# Patient Record
Sex: MALE | Race: WHITE | Employment: OTHER | URBAN - METROPOLITAN AREA
[De-identification: names, ages, dates, MRNs, and addresses within clinical notes are randomized per-mention and may not be internally consistent; named-entity substitution may affect disease eponyms.]

---

## 2017-01-01 ENCOUNTER — MYC MEDICAL ADVICE (OUTPATIENT)
Dept: FAMILY MEDICINE | Facility: CLINIC | Age: 72
End: 2017-01-01

## 2017-01-01 ENCOUNTER — PRE VISIT (OUTPATIENT)
Dept: PULMONOLOGY | Facility: CLINIC | Age: 72
End: 2017-01-01

## 2017-01-01 ENCOUNTER — OFFICE VISIT (OUTPATIENT)
Dept: FAMILY MEDICINE | Facility: CLINIC | Age: 72
End: 2017-01-01
Payer: MEDICARE

## 2017-01-01 ENCOUNTER — TELEPHONE (OUTPATIENT)
Dept: FAMILY MEDICINE | Facility: CLINIC | Age: 72
End: 2017-01-01

## 2017-01-01 ENCOUNTER — OFFICE VISIT (OUTPATIENT)
Dept: NURSING | Facility: CLINIC | Age: 72
End: 2017-01-01
Payer: MEDICARE

## 2017-01-01 ENCOUNTER — TRANSFERRED RECORDS (OUTPATIENT)
Dept: HEALTH INFORMATION MANAGEMENT | Facility: CLINIC | Age: 72
End: 2017-01-01

## 2017-01-01 ENCOUNTER — TELEPHONE (OUTPATIENT)
Dept: PULMONOLOGY | Facility: CLINIC | Age: 72
End: 2017-01-01

## 2017-01-01 ENCOUNTER — OFFICE VISIT (OUTPATIENT)
Dept: PULMONOLOGY | Facility: CLINIC | Age: 72
End: 2017-01-01
Payer: MEDICARE

## 2017-01-01 VITALS
WEIGHT: 235.3 LBS | RESPIRATION RATE: 16 BRPM | BODY MASS INDEX: 31.87 KG/M2 | TEMPERATURE: 98.6 F | HEIGHT: 72 IN | SYSTOLIC BLOOD PRESSURE: 134 MMHG | DIASTOLIC BLOOD PRESSURE: 80 MMHG | OXYGEN SATURATION: 94 % | HEART RATE: 74 BPM

## 2017-01-01 VITALS
TEMPERATURE: 98.9 F | OXYGEN SATURATION: 97 % | BODY MASS INDEX: 31.08 KG/M2 | HEIGHT: 72 IN | HEART RATE: 71 BPM | RESPIRATION RATE: 24 BRPM | DIASTOLIC BLOOD PRESSURE: 77 MMHG | WEIGHT: 229.5 LBS | SYSTOLIC BLOOD PRESSURE: 145 MMHG

## 2017-01-01 VITALS
HEIGHT: 73 IN | RESPIRATION RATE: 16 BRPM | BODY MASS INDEX: 30.46 KG/M2 | WEIGHT: 229.8 LBS | TEMPERATURE: 98.4 F | DIASTOLIC BLOOD PRESSURE: 69 MMHG | OXYGEN SATURATION: 94 % | HEART RATE: 82 BPM | SYSTOLIC BLOOD PRESSURE: 104 MMHG

## 2017-01-01 VITALS
DIASTOLIC BLOOD PRESSURE: 86 MMHG | HEART RATE: 83 BPM | WEIGHT: 238.4 LBS | SYSTOLIC BLOOD PRESSURE: 134 MMHG | BODY MASS INDEX: 32.33 KG/M2 | TEMPERATURE: 98.6 F | OXYGEN SATURATION: 97 %

## 2017-01-01 VITALS
OXYGEN SATURATION: 95 % | RESPIRATION RATE: 16 BRPM | TEMPERATURE: 97.8 F | HEART RATE: 88 BPM | HEIGHT: 73 IN | SYSTOLIC BLOOD PRESSURE: 128 MMHG | DIASTOLIC BLOOD PRESSURE: 82 MMHG | WEIGHT: 219.2 LBS | BODY MASS INDEX: 29.05 KG/M2

## 2017-01-01 DIAGNOSIS — Z11.59 NEED FOR HEPATITIS C SCREENING TEST: ICD-10-CM

## 2017-01-01 DIAGNOSIS — E29.1 MALE HYPOGONADISM: ICD-10-CM

## 2017-01-01 DIAGNOSIS — Z72.0 TOBACCO ABUSE: ICD-10-CM

## 2017-01-01 DIAGNOSIS — E78.5 HYPERLIPIDEMIA LDL GOAL <130: ICD-10-CM

## 2017-01-01 DIAGNOSIS — L30.9 DERMATITIS: ICD-10-CM

## 2017-01-01 DIAGNOSIS — J44.9 CHRONIC OBSTRUCTIVE PULMONARY DISEASE, UNSPECIFIED COPD TYPE (H): ICD-10-CM

## 2017-01-01 DIAGNOSIS — E22.2 SIADH (SYNDROME OF INAPPROPRIATE ADH PRODUCTION) (H): ICD-10-CM

## 2017-01-01 DIAGNOSIS — B37.2 YEAST DERMATITIS: ICD-10-CM

## 2017-01-01 DIAGNOSIS — E22.2 SIADH (SYNDROME OF INAPPROPRIATE ADH PRODUCTION) (H): Primary | ICD-10-CM

## 2017-01-01 DIAGNOSIS — R10.13 DYSPEPSIA: ICD-10-CM

## 2017-01-01 DIAGNOSIS — I10 ESSENTIAL HYPERTENSION WITH GOAL BLOOD PRESSURE LESS THAN 140/90: ICD-10-CM

## 2017-01-01 DIAGNOSIS — J44.9 CHRONIC OBSTRUCTIVE PULMONARY DISEASE, UNSPECIFIED COPD TYPE (H): Primary | ICD-10-CM

## 2017-01-01 DIAGNOSIS — R76.8 POSITIVE HEPATITIS C ANTIBODY TEST: ICD-10-CM

## 2017-01-01 DIAGNOSIS — L50.2 HIVES DUE TO COLD EXPOSURE: ICD-10-CM

## 2017-01-01 DIAGNOSIS — L72.0 EIC (EPIDERMAL INCLUSION CYST): ICD-10-CM

## 2017-01-01 DIAGNOSIS — L50.2 HIVES DUE TO COLD EXPOSURE: Primary | ICD-10-CM

## 2017-01-01 DIAGNOSIS — J44.1 COPD EXACERBATION (H): Primary | ICD-10-CM

## 2017-01-01 DIAGNOSIS — R53.83 FATIGUE, UNSPECIFIED TYPE: ICD-10-CM

## 2017-01-01 DIAGNOSIS — Z23 NEED FOR PROPHYLACTIC VACCINATION AND INOCULATION AGAINST INFLUENZA: ICD-10-CM

## 2017-01-01 LAB
6 MIN WALK (FT): NORMAL FT
6 MIN WALK (M): NORMAL M
ALBUMIN SERPL-MCNC: 3.4 G/DL (ref 3.4–5)
ALBUMIN SERPL-MCNC: 3.8 G/DL (ref 3.4–5)
ALP SERPL-CCNC: 100 U/L (ref 40–150)
ALP SERPL-CCNC: 162 U/L (ref 40–150)
ALT SERPL W P-5'-P-CCNC: 15 U/L (ref 0–70)
ALT SERPL W P-5'-P-CCNC: 17 U/L (ref 0–70)
ANION GAP SERPL CALCULATED.3IONS-SCNC: 4 MMOL/L (ref 3–14)
ANION GAP SERPL CALCULATED.3IONS-SCNC: 5 MMOL/L (ref 3–14)
ANION GAP SERPL CALCULATED.3IONS-SCNC: 5 MMOL/L (ref 3–14)
ANION GAP SERPL CALCULATED.3IONS-SCNC: 6 MMOL/L (ref 3–14)
ANION GAP SERPL CALCULATED.3IONS-SCNC: 7 MMOL/L (ref 3–14)
ANION GAP SERPL CALCULATED.3IONS-SCNC: 8 MMOL/L (ref 3–14)
AST SERPL W P-5'-P-CCNC: 16 U/L (ref 0–45)
AST SERPL W P-5'-P-CCNC: 22 U/L (ref 0–45)
BILIRUB DIRECT SERPL-MCNC: 0.2 MG/DL (ref 0–0.2)
BILIRUB SERPL-MCNC: 0.7 MG/DL (ref 0.2–1.3)
BILIRUB SERPL-MCNC: 0.8 MG/DL (ref 0.2–1.3)
BUN SERPL-MCNC: 10 MG/DL (ref 7–30)
BUN SERPL-MCNC: 11 MG/DL (ref 7–30)
BUN SERPL-MCNC: 12 MG/DL (ref 7–30)
BUN SERPL-MCNC: 13 MG/DL (ref 7–30)
BUN SERPL-MCNC: 7 MG/DL (ref 7–30)
BUN SERPL-MCNC: 7 MG/DL (ref 7–30)
CALCIUM SERPL-MCNC: 8.5 MG/DL (ref 8.5–10.1)
CALCIUM SERPL-MCNC: 8.6 MG/DL (ref 8.5–10.1)
CALCIUM SERPL-MCNC: 8.6 MG/DL (ref 8.5–10.1)
CALCIUM SERPL-MCNC: 8.7 MG/DL (ref 8.5–10.1)
CALCIUM SERPL-MCNC: 8.7 MG/DL (ref 8.5–10.1)
CALCIUM SERPL-MCNC: 8.8 MG/DL (ref 8.5–10.1)
CHLORIDE SERPL-SCNC: 95 MMOL/L (ref 94–109)
CHLORIDE SERPL-SCNC: 96 MMOL/L (ref 94–109)
CHLORIDE SERPL-SCNC: 97 MMOL/L (ref 94–109)
CHLORIDE SERPL-SCNC: 99 MMOL/L (ref 94–109)
CHOLEST SERPL-MCNC: 136 MG/DL
CO2 SERPL-SCNC: 29 MMOL/L (ref 20–32)
CO2 SERPL-SCNC: 30 MMOL/L (ref 20–32)
CO2 SERPL-SCNC: 30 MMOL/L (ref 20–32)
CO2 SERPL-SCNC: 33 MMOL/L (ref 20–32)
CO2 SERPL-SCNC: 35 MMOL/L (ref 20–32)
CO2 SERPL-SCNC: 36 MMOL/L (ref 20–32)
CREAT SERPL-MCNC: 0.86 MG/DL (ref 0.66–1.25)
CREAT SERPL-MCNC: 0.9 MG/DL (ref 0.66–1.25)
CREAT SERPL-MCNC: 0.96 MG/DL (ref 0.66–1.25)
CREAT SERPL-MCNC: 0.96 MG/DL (ref 0.66–1.25)
CREAT SERPL-MCNC: 0.99 MG/DL (ref 0.66–1.25)
CREAT SERPL-MCNC: 1.12 MG/DL (ref 0.66–1.25)
DLCOUNC-%PRED-PRE: 82 %
DLCOUNC-%PRED-PRE: 90 %
DLCOUNC-PRE: 22.04 ML/MIN/MMHG
DLCOUNC-PRE: 24.16 ML/MIN/MMHG
DLCOUNC-PRED: 26.77 ML/MIN/MMHG
DLCOUNC-PRED: 26.83 ML/MIN/MMHG
ERV-%PRED-PRE: 101 %
ERV-%PRED-PRE: 40 %
ERV-PRE: 0.4 L
ERV-PRE: 0.98 L
ERV-PRED: 0.96 L
ERV-PRED: 0.97 L
EXPTIME-PRE: 9.07 SEC
EXPTIME-PRE: 9.51 SEC
FEF2575-%PRED-POST: 50 %
FEF2575-%PRED-PRE: 25 %
FEF2575-%PRED-PRE: 32 %
FEF2575-POST: 1.29 L/SEC
FEF2575-PRE: 0.66 L/SEC
FEF2575-PRE: 0.83 L/SEC
FEF2575-PRED: 2.53 L/SEC
FEF2575-PRED: 2.55 L/SEC
FEFMAX-%PRED-PRE: 45 %
FEFMAX-%PRED-PRE: 53 %
FEFMAX-PRE: 3.94 L/SEC
FEFMAX-PRE: 4.62 L/SEC
FEFMAX-PRED: 8.71 L/SEC
FEFMAX-PRED: 8.74 L/SEC
FEV1-%PRED-PRE: 42 %
FEV1-%PRED-PRE: 52 %
FEV1-PRE: 1.45 L
FEV1-PRE: 1.78 L
FEV1FEV6-PRE: 55 %
FEV1FEV6-PRE: 57 %
FEV1FEV6-PRED: 78 %
FEV1FEV6-PRED: 78 %
FEV1FVC-PRE: 51 %
FEV1FVC-PRE: 54 %
FEV1FVC-PRED: 76 %
FEV1FVC-PRED: 76 %
FEV1SVC-PRE: 35 %
FEV1SVC-PRE: 50 %
FEV1SVC-PRED: 67 %
FEV1SVC-PRED: 67 %
FIFMAX-PRE: 3.79 L/SEC
FIFMAX-PRE: 3.96 L/SEC
FRCPLETH-%PRED-PRE: 159 %
FRCPLETH-%PRED-PRE: 176 %
FRCPLETH-PRE: 6.08 L
FRCPLETH-PRE: 6.74 L
FRCPLETH-PRED: 3.81 L
FRCPLETH-PRED: 3.81 L
FVC-%PRED-PRE: 62 %
FVC-%PRED-PRE: 73 %
FVC-PRE: 2.82 L
FVC-PRE: 3.31 L
FVC-PRED: 4.49 L
FVC-PRED: 4.5 L
GFR SERPL CREATININE-BSD FRML MDRD: 65 ML/MIN/1.7M2
GFR SERPL CREATININE-BSD FRML MDRD: 75 ML/MIN/1.7M2
GFR SERPL CREATININE-BSD FRML MDRD: 77 ML/MIN/1.7M2
GFR SERPL CREATININE-BSD FRML MDRD: 77 ML/MIN/1.7M2
GFR SERPL CREATININE-BSD FRML MDRD: 83 ML/MIN/1.7M2
GFR SERPL CREATININE-BSD FRML MDRD: 88 ML/MIN/1.7M2
GLUCOSE SERPL-MCNC: 101 MG/DL (ref 70–99)
GLUCOSE SERPL-MCNC: 96 MG/DL (ref 70–99)
GLUCOSE SERPL-MCNC: 96 MG/DL (ref 70–99)
GLUCOSE SERPL-MCNC: 97 MG/DL (ref 70–99)
GLUCOSE SERPL-MCNC: 99 MG/DL (ref 70–99)
GLUCOSE SERPL-MCNC: 99 MG/DL (ref 70–99)
HCV AB SERPL QL IA: ABNORMAL
HCV RNA SERPL NAA+PROBE-ACNC: NORMAL [IU]/ML
HCV RNA SERPL NAA+PROBE-LOG IU: NORMAL LOG IU/ML
HDLC SERPL-MCNC: 66 MG/DL
IC-%PRED-PRE: 76 %
IC-%PRED-PRE: 77 %
IC-PRE: 3.12 L
IC-PRE: 3.15 L
IC-PRED: 4.09 L
IC-PRED: 4.1 L
LDLC SERPL CALC-MCNC: 59 MG/DL
NONHDLC SERPL-MCNC: 70 MG/DL
POTASSIUM SERPL-SCNC: 4 MMOL/L (ref 3.4–5.3)
POTASSIUM SERPL-SCNC: 4 MMOL/L (ref 3.4–5.3)
POTASSIUM SERPL-SCNC: 4.1 MMOL/L (ref 3.4–5.3)
POTASSIUM SERPL-SCNC: 4.3 MMOL/L (ref 3.4–5.3)
POTASSIUM SERPL-SCNC: 4.4 MMOL/L (ref 3.4–5.3)
POTASSIUM SERPL-SCNC: 4.7 MMOL/L (ref 3.4–5.3)
PROT SERPL-MCNC: 7.4 G/DL (ref 6.8–8.8)
PROT SERPL-MCNC: 7.6 G/DL (ref 6.8–8.8)
RVPLETH-%PRED-PRE: 188 %
RVPLETH-%PRED-PRE: 234 %
RVPLETH-PRE: 5.1 L
RVPLETH-PRE: 6.34 L
RVPLETH-PRED: 2.7 L
RVPLETH-PRED: 2.71 L
SODIUM SERPL-SCNC: 131 MMOL/L (ref 133–144)
SODIUM SERPL-SCNC: 132 MMOL/L (ref 133–144)
SODIUM SERPL-SCNC: 135 MMOL/L (ref 133–144)
SODIUM SERPL-SCNC: 136 MMOL/L (ref 133–144)
SODIUM SERPL-SCNC: 136 MMOL/L (ref 133–144)
SODIUM SERPL-SCNC: 137 MMOL/L (ref 133–144)
TLCPLETH-%PRED-PRE: 122 %
TLCPLETH-%PRED-PRE: 132 %
TLCPLETH-PRE: 9.2 L
TLCPLETH-PRE: 9.89 L
TLCPLETH-PRED: 7.48 L
TLCPLETH-PRED: 7.48 L
TRIGL SERPL-MCNC: 54 MG/DL
VA-%PRED-PRE: 86 %
VA-%PRED-PRE: 86 %
VA-PRE: 6.17 L
VA-PRE: 6.2 L
VC-%PRED-PRE: 70 %
VC-%PRED-PRE: 81 %
VC-PRE: 3.55 L
VC-PRE: 4.1 L
VC-PRED: 5.06 L
VC-PRED: 5.06 L

## 2017-01-01 PROCEDURE — 86803 HEPATITIS C AB TEST: CPT | Performed by: FAMILY MEDICINE

## 2017-01-01 PROCEDURE — 99214 OFFICE O/P EST MOD 30 MIN: CPT | Mod: 25 | Performed by: FAMILY MEDICINE

## 2017-01-01 PROCEDURE — 36415 COLL VENOUS BLD VENIPUNCTURE: CPT | Performed by: FAMILY MEDICINE

## 2017-01-01 PROCEDURE — 80048 BASIC METABOLIC PNL TOTAL CA: CPT | Performed by: FAMILY MEDICINE

## 2017-01-01 PROCEDURE — 80053 COMPREHEN METABOLIC PANEL: CPT | Performed by: FAMILY MEDICINE

## 2017-01-01 PROCEDURE — 94375 RESPIRATORY FLOW VOLUME LOOP: CPT | Performed by: INTERNAL MEDICINE

## 2017-01-01 PROCEDURE — 94729 DIFFUSING CAPACITY: CPT | Performed by: INTERNAL MEDICINE

## 2017-01-01 PROCEDURE — 87522 HEPATITIS C REVRS TRNSCRPJ: CPT | Performed by: FAMILY MEDICINE

## 2017-01-01 PROCEDURE — 99214 OFFICE O/P EST MOD 30 MIN: CPT | Performed by: FAMILY MEDICINE

## 2017-01-01 PROCEDURE — 94726 PLETHYSMOGRAPHY LUNG VOLUMES: CPT | Performed by: INTERNAL MEDICINE

## 2017-01-01 PROCEDURE — 94664 DEMO&/EVAL PT USE INHALER: CPT | Mod: 59 | Performed by: INTERNAL MEDICINE

## 2017-01-01 PROCEDURE — 80061 LIPID PANEL: CPT | Performed by: FAMILY MEDICINE

## 2017-01-01 PROCEDURE — 94060 EVALUATION OF WHEEZING: CPT | Performed by: INTERNAL MEDICINE

## 2017-01-01 PROCEDURE — 99214 OFFICE O/P EST MOD 30 MIN: CPT | Mod: 25 | Performed by: INTERNAL MEDICINE

## 2017-01-01 PROCEDURE — 94620 HC PULMONARY STRESS TEST, SIMPLE: CPT | Performed by: INTERNAL MEDICINE

## 2017-01-01 PROCEDURE — G0008 ADMIN INFLUENZA VIRUS VAC: HCPCS | Performed by: FAMILY MEDICINE

## 2017-01-01 PROCEDURE — 99204 OFFICE O/P NEW MOD 45 MIN: CPT | Mod: 25 | Performed by: INTERNAL MEDICINE

## 2017-01-01 PROCEDURE — 90662 IIV NO PRSV INCREASED AG IM: CPT | Performed by: FAMILY MEDICINE

## 2017-01-01 PROCEDURE — 82248 BILIRUBIN DIRECT: CPT | Performed by: FAMILY MEDICINE

## 2017-01-01 PROCEDURE — G0472 HEP C SCREEN HIGH RISK/OTHER: HCPCS | Performed by: FAMILY MEDICINE

## 2017-01-01 RX ORDER — SODIUM CHLORIDE 1 G/1
1 TABLET ORAL DAILY
Qty: 30 TABLET | Refills: 1 | Status: SHIPPED | OUTPATIENT
Start: 2017-01-01 | End: 2017-01-01

## 2017-01-01 RX ORDER — NYSTATIN 100000 U/G
CREAM TOPICAL
Qty: 30 G | Refills: 0 | Status: SHIPPED | OUTPATIENT
Start: 2017-01-01

## 2017-01-01 RX ORDER — AMLODIPINE AND BENAZEPRIL HYDROCHLORIDE 10; 40 MG/1; MG/1
CAPSULE ORAL
Qty: 90 CAPSULE | Refills: 1 | Status: SHIPPED | OUTPATIENT
Start: 2017-01-01 | End: 2017-01-01

## 2017-01-01 RX ORDER — ALBUTEROL SULFATE 90 UG/1
2 AEROSOL, METERED RESPIRATORY (INHALATION) EVERY 6 HOURS PRN
Qty: 3 INHALER | Refills: 3 | Status: SHIPPED | OUTPATIENT
Start: 2017-01-01

## 2017-01-01 RX ORDER — PREDNISONE 20 MG/1
20 TABLET ORAL DAILY
Qty: 21 TABLET | Refills: 0 | Status: SHIPPED | OUTPATIENT
Start: 2017-01-01 | End: 2018-01-01

## 2017-01-01 RX ORDER — TESTOSTERONE 10 MG/.5G
2 GEL, METERED TOPICAL DAILY
Qty: 60 G | Refills: 5 | Status: SHIPPED | OUTPATIENT
Start: 2017-01-01

## 2017-01-01 RX ORDER — NYSTATIN 100000 U/G
CREAM TOPICAL 3 TIMES DAILY
Qty: 30 G | Refills: 1 | Status: SHIPPED | OUTPATIENT
Start: 2017-01-01 | End: 2017-01-01

## 2017-01-01 RX ORDER — FLUOCINONIDE 0.5 MG/G
CREAM TOPICAL
Qty: 60 G | Refills: 2 | Status: SHIPPED | OUTPATIENT
Start: 2017-01-01 | End: 2018-01-01

## 2017-01-01 RX ORDER — CYANOCOBALAMIN 1000 UG/ML
INJECTION, SOLUTION INTRAMUSCULAR; SUBCUTANEOUS
Qty: 1 ML | Refills: 11 | Status: SHIPPED | OUTPATIENT
Start: 2017-01-01

## 2017-01-01 RX ORDER — FLUOCINONIDE 0.5 MG/G
CREAM TOPICAL
Qty: 60 G | Refills: 0 | OUTPATIENT
Start: 2017-01-01

## 2017-01-01 RX ORDER — PREDNISONE 20 MG/1
TABLET ORAL
Qty: 21 TABLET | Refills: 0 | Status: SHIPPED | OUTPATIENT
Start: 2017-01-01 | End: 2017-01-01

## 2017-01-01 RX ORDER — LORATADINE 10 MG/1
10 TABLET ORAL 2 TIMES DAILY PRN
Qty: 60 TABLET | Refills: 2 | Status: SHIPPED | OUTPATIENT
Start: 2017-01-01

## 2017-01-01 RX ORDER — FLUOCINONIDE TOPICAL SOLUTION USP, 0.05% 0.5 MG/ML
SOLUTION TOPICAL
Qty: 60 ML | Refills: 2 | Status: SHIPPED | OUTPATIENT
Start: 2017-01-01 | End: 2018-01-01

## 2017-01-01 RX ORDER — SODIUM CHLORIDE 1 G/1
TABLET ORAL
Qty: 90 TABLET | Refills: 1 | Status: SHIPPED | OUTPATIENT
Start: 2017-01-01

## 2017-01-01 RX ORDER — SODIUM CHLORIDE 1 G/1
TABLET ORAL
Qty: 30 TABLET | Refills: 2 | Status: SHIPPED | OUTPATIENT
Start: 2017-01-01 | End: 2017-01-01

## 2017-01-01 RX ORDER — FLUOCINONIDE 0.5 MG/G
CREAM TOPICAL
Qty: 60 G | Refills: 3 | Status: SHIPPED | OUTPATIENT
Start: 2017-01-01 | End: 2017-01-01

## 2017-01-01 ASSESSMENT — PAIN SCALES - GENERAL
PAINLEVEL: NO PAIN (1)
PAINLEVEL: NO PAIN (1)

## 2017-01-04 ENCOUNTER — DOCUMENTATION ONLY (OUTPATIENT)
Dept: OTHER | Facility: CLINIC | Age: 72
End: 2017-01-04

## 2017-01-04 DIAGNOSIS — Z71.89 ADVANCED DIRECTIVES, COUNSELING/DISCUSSION: Primary | Chronic | ICD-10-CM

## 2017-02-10 PROBLEM — R53.83 FATIGUE, UNSPECIFIED TYPE: Status: ACTIVE | Noted: 2017-01-01

## 2017-02-10 NOTE — PROGRESS NOTES
"  SUBJECTIVE:                                                    Alexi Oviedo is a 71 year old male who presents to clinic today for the following health issues:    1. Derm concern - rash under R arm (related to recent break)  2. Boil - back of neck  3. Hemorrhoids? -- pt previously had an abscess (surgically removed in 2014) - also had one removed 1 year later.  4. B12 injection?  5. Follow up labs - recheck sodium    SUBJECTIVE:  Here today to follow-up on a few issues. I saw him last time he was recently out of the hospital for a right humeral fracture. The fracture seems to be healing nicely and he has been going through therapy. Range of motion is slowly returning. But he developed an itchy rash in his right axilla. Wife has been putting some Lidex on the area and has reduced some of the itch but has not improved. Also notes a bump on his upper back which drains a thick foul-smelling material. Has had this for a number of years and they're wondering what can be done about it. Due for another B-12 injection - periodically for some nonspecific fatigue.  Also notes return of some hemorrhoids - but he has had a perirectal abscess in the past and wants to make sure that is not developing    Would also came out of the hospitalization was a chronically low sodium that seem consistent with SIADH given his urine osmolality. He is set up to see Dr. Sheppard in March but is wondering if he really needs to see her. I had a long discussion with the patient and his wife about the variable causes of SIADH - and then his case is difficult to say if this is an idiopathic condition, related to his lung issues, etc. He says he is feeling fine as long as he takes salt tablets and watches his fluid intake.    Review of systems otherwise negative.  Past medical, family, and social history reviewed and updated in chart.    OBJECTIVE:  /82 mmHg  Pulse 88  Temp(Src) 97.8  F (36.6  C) (Oral)  Resp 16  Ht 1.854 m (6' 1\")  Wt " 99.428 kg (219 lb 3.2 oz)  BMI 28.93 kg/m2  SpO2 95%  Alert, pleasant, upbeat, and in no apparent discomfort.  Ears normal. Throat and pharynx normal. Neck supple. No adenopathy or masses in the neck or supraclavicular regions. Sinuses non tender.  S1 and S2 normal, no murmurs, clicks, gallops or rubs. Regular rate and rhythm. Chest is clear; no wheezes or rales. No edema or JVD.   Skin - I cannot discern a discrete cyst on his upper back but he has an area bandage and there has the typical smell of epithelial material in that area  He has a dull red confluent eruption in his right axilla  Anal - no evidence of abscess. Small hemorrhoid formations  Past labs reviewed with the patient.     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 We can recheck the level. I guess my thinking is might be nice to have him see Dr. Sheppard at least one time in consultation to make sure we are following the right path. Fluid restriction is the key to treating this unless it is something that is a fixable type issue - that is the reason for the referral  Plan: Basic metabolic panel, sodium chloride 1 GM         tablet            (R53.83) Fatigue, unspecified type  Comment: refilled   Plan: cyanocobalamin (VITAMIN B12) 1000 MCG/ML         injection            (L72.0) EIC (epidermal inclusion cyst)  Comment: I would like them to let the cyst develop a little bit more and then we can squeeze him in for an excision - right now I wouldn't know exactly where to cut  Plan:     (B37.2) Yeast dermatitis  Comment: Stop cortisone  Plan: nystatin (MYCOSTATIN) cream            (Z11.59) Need for hepatitis C screening test  Comment:   Plan: Hepatitis C antibody            Follow up as above  PETAR Mari MD    (Chart documentation completed in part with Dragon voice-recognition software.  Even though reviewed some  grammatical, spelling, and word errors may remain.)

## 2017-02-10 NOTE — NURSING NOTE
"Chief Complaint   Patient presents with     Derm Problem     rash under arm, boil (how can we not allow this to follow up?)     Lab Result Notice     sodium result -- next steps as well     Imm/Inj     b12?       Initial /82 mmHg  Pulse 88  Temp(Src) 97.8  F (36.6  C) (Oral)  Resp 16  Ht 1.854 m (6' 1\")  Wt 99.428 kg (219 lb 3.2 oz)  BMI 28.93 kg/m2  SpO2 95% Estimated body mass index is 28.93 kg/(m^2) as calculated from the following:    Height as of this encounter: 1.854 m (6' 1\").    Weight as of this encounter: 99.428 kg (219 lb 3.2 oz).  Medication Reconciliation: complete     Will Amy GONZÁLES      "

## 2017-02-23 NOTE — PROGRESS NOTES
Reed,  Great news!  There was no virus detected upon direct testing.  So either this was a false positive screening test, or you have immunity from some distant past exposure.  Either way, very good results  PETAR Mari M.D.

## 2017-04-11 NOTE — TELEPHONE ENCOUNTER
PULMONARY NEW PATIENT PRE-VISIT ASSESSMENT    Date Patient Contacted: 4/11/17    Confirmed appointment times and location     1. Patient is scheduled to see Dr. Reece on 5/2/17  2. Reason for visit: Chronic obstructive pulmonary disease, unspecified COPD type (H) [J44.9]   3. Referring provider PCP - Dr. Mari    4. Has patient seen previous specialist? No      5. Previous Imaging: In [facility]: Last CXR done: 5/15/15, Last Chest CT done: none      Outside Imaging: Location/Date/Type/Status (Requested, Received, Patient will hand carry disc, Pushed to [employer], Disc will be mailed) CXR done 12/16/16 at [facility] - report in CareEverywhere. Pt denies any other. Encounter routed to admin coordinators to request image be pushed to our system.        6.  Pulmonary Function Tests: Scheduled at  5/2/17       Outside PFT Lab:  Location/Date/Status (Requested, Received, Patient will hand carry) Pt denies     7.  Sleep History (sleep concerns, diagnosed sleep disorders): Pt denies    Prior Sleep Study: Pt denies    CPAP/BIPAP? Pt denies    Oxygen? Pt denies    Previsit review complete.  Patient will see provider at future scheduled appointment.     Patient Reminders Given:  Please, make sure you bring an updated list of your medications.   If you need to cancel or reschedule, please call [phone].      Latasha Ryder, RN, BSN  Pulmonary Care Coordinator

## 2017-04-11 NOTE — TELEPHONE ENCOUNTER
Routing refill request to provider for review/approval because:  Drug not on the [facility] refill protocol   Linda Blas RN

## 2017-04-11 NOTE — TELEPHONE ENCOUNTER
sodium chloride 1 GM tablet      Last Written Prescription Date: 2/10/17  Last Fill Quantity: 30, # refills: 1  Last Office Visit with [facility], [facility] or [facility] prescribing provider: 2/10/17  Next 5 appointments (look out 90 days)     May 02, 2017  8:00 AM CDT   Office Visit with PFT LAB   [facility]    [address] [place] [id number]   [phone]            May 12, 2017 10:00 AM CDT   MyChart Short with Janelle Mari MD   [facility]    [address] [place] [id number] [phone]                   Potassium   Date Value Ref Range Status   02/20/2017 4.0 3.4 - 5.3 mmol/L Final     Creatinine   Date Value Ref Range Status   02/20/2017 0.90 0.66 - 1.25 mg/dL Final     BP Readings from Last 3 Encounters:   02/10/17 128/82   12/28/16 126/78   11/02/16 125/79

## 2017-04-11 NOTE — TELEPHONE ENCOUNTER
Contacted and left a message for the [facility] film room.    Requested CXR from 12/16/16 be pushed into our system.  Report found in [employer].     Archive request form filled out and sent to  imaging team. Sheila Georges CMA,

## 2017-05-02 NOTE — MR AVS SNAPSHOT
After Visit Summary   5/2/2017    Alexi Oviedo    MRN: [id number]           Patient Information     Date Of Birth          1945        Visit Information        Provider Department      5/2/2017 9:00 AM Lorri Reece MD [facility]        Today's Diagnoses     Chronic obstructive pulmonary disease, unspecified COPD type (H)    -  1       Follow-ups after your visit        Future tests that were ordered for you today     Open Future Orders        Priority Expected Expires Ordered    General PFT Lab (Please always keep checked) Routine 8/8/2017 5/2/2018 5/2/2017    6 minute walk test Routine  5/2/2018 5/2/2017    Pulmonary Function Test Routine  5/2/2018 5/2/2017            Who to contact     If you have questions or need follow up information about today's clinic visit or your schedule please contact [facility] directly at [phone].  Normal or non-critical lab and imaging results will be communicated to you by [employer], letter or phone within 4 business days after the clinic has received the results. If you do not hear from us within 7 days, please contact the clinic through [employer] or phone. If you have a critical or abnormal lab result, we will notify you by phone as soon as possible.  Submit refill requests through [employer] or call your pharmacy and they will forward the refill request to us. Please allow 3 business days for your refill to be completed.          Additional Information About Your Visit        MyChart Information     [employer] gives you secure access to your electronic health record. If you see a primary care provider, you can also send messages to your care team and make appointments. If you have questions, please call your primary care clinic.  If you do not have a primary care provider, please call [phone] and they will assist you.      [employer] is an electronic gateway that provides easy, online access to your medical records.  "With MyChart, you can request a clinic appointment, read your test results, renew a prescription or communicate with your care team.     To access your existing account, please contact your [facility] or call [phone] for assistance.        Care EveryWhere ID     This is your Care EveryWhere ID. This could be used by other organizations to access your [place] medical records  [id number]        Your Vitals Were     Pulse Temperature Respirations Height Pulse Oximetry BMI (Body Mass Index)    82 98.4  F (36.9  C) (Oral) 16 1.854 m (6' 1\") 94% 30.32 kg/m2       Blood Pressure from Last 3 Encounters:   05/02/17 104/69   02/10/17 128/82   12/28/16 126/78    Weight from Last 3 Encounters:   05/02/17 104.2 kg (229 lb 12.8 oz)   02/10/17 99.4 kg (219 lb 3.2 oz)   11/02/16 104.8 kg (231 lb 0.7 oz)                 Today's Medication Changes          These changes are accurate as of: 5/2/17  9:51 AM.  If you have any questions, ask your nurse or doctor.               Start taking these medicines.        Dose/Directions    fluticasone-salmeterol 500-50 MCG/DOSE diskus inhaler   Commonly known as:  ADVAIR   Used for:  Chronic obstructive pulmonary disease, unspecified COPD type (H)   Started by:  Lorri Reece MD        Dose:  1 puff   Inhale 1 puff into the lungs every 12 hours   Quantity:  60 Inhaler   Refills:  3            Where to get your medicines      These medications were sent to [id number] - [place], [place] - [address] AT [facility] [address], [facility] [address]     Phone:  [phone]     fluticasone-salmeterol 500-50 MCG/DOSE diskus inhaler                Primary Care Provider Office Phone # Fax #    Janelle Mari [phone]       [address] [address]        Thank you!     Thank you for choosing [facility]  for your care. " Our goal is always to provide you with excellent care. Hearing back from our patients is one way we can continue to improve our services. Please take a few minutes to complete the written survey that you may receive in the mail after your visit with us. Thank you!             Your Updated Medication List - Protect others around you: Learn how to safely use, store and throw away your medicines at www.disposemymeds.org.          This list is accurate as of: 5/2/17  9:51 AM.  Always use your most recent med list.                   Brand Name Dispense Instructions for use    amLODIPine-benazepril 10-40 MG per capsule    LOTREL    90 capsule    TAKE 1 CAPSULE BY MOUTH ONCE DAILY       cyanocobalamin 1000 MCG/ML injection    VITAMIN B12    1 mL    1 ml IM monthly       fluocinonide 0.05 % cream    LIDEX    60 g    APPLY TO THE AFFECTED AREA TWICE DAILY AS NEEDED       fluticasone-salmeterol 500-50 MCG/DOSE diskus inhaler    ADVAIR    60 Inhaler    Inhale 1 puff into the lungs every 12 hours       magnesium chloride 535 (64 MG) MG Tbcr CR tablet     100 tablet    Take 1 tablet (535 mg) by mouth daily       omeprazole 20 MG CR capsule    priLOSEC    180 capsule    TAKE TWO CAPSULES BY MOUTH DAILY       sodium chloride 1 GM tablet     30 tablet    TAKE 1 TABLET(1 GRAM) BY MOUTH DAILY       Testosterone 10 MG/ACT (2%) Gel    FORTESTA    60 g    Place 2 pumps onto the skin daily Apply from dispenser to clean, dry, intact skin of the upper arms and shoulders.

## 2017-05-02 NOTE — NURSING NOTE
"Alexi Oviedo's goals for this visit include: COPD  He requests these members of his care team be copied on today's visit information: yes    PCP: Janelle aMri    Referring Provider:  No referring provider defined for this encounter.    Chief Complaint   Patient presents with     Consult       Initial /69 (BP Location: Left arm, Patient Position: Chair, Cuff Size: Adult Large)  Pulse 82  Temp 98.4  F (36.9  C) (Oral)  Resp 16  Ht 1.854 m (6' 1\")  Wt 104.2 kg (229 lb 12.8 oz)  SpO2 94%  BMI 30.32 kg/m2 Estimated body mass index is 30.32 kg/(m^2) as calculated from the following:    Height as of this encounter: 1.854 m (6' 1\").    Weight as of this encounter: 104.2 kg (229 lb 12.8 oz).  Medication Reconciliation: complete    Do you need any medication refills at today's visit? no    "

## 2017-05-02 NOTE — PROGRESS NOTES
PFT Note:  Completed SVC, FVC, DLCO and Pleth with repeat of FVC after 4 puffs albuterol per Dr. Reece's order.  Instructed in the proper use of Advair and gave instruction sheet to patient. He seemed to understand instructions well.

## 2017-05-02 NOTE — MR AVS SNAPSHOT
After Visit Summary   5/2/2017    Alexi Oviedo    MRN: [id number]           Patient Information     Date Of Birth          1945        Visit Information        Provider Department      5/2/2017 8:00 AM PFT LAB [facility]        Today's Diagnoses     COPD exacerbation (H)    -  1       Follow-ups after your visit        Your next 10 appointments already scheduled     May 02, 2017  9:00 AM CDT   New Visit with Lorri Reece MD   [facility] ([facility]    [address] [place] [address] [phone]              Who to contact     If you have questions or need follow up information about today's clinic visit or your schedule please contact [facility] directly at [phone].  Normal or non-critical lab and imaging results will be communicated to you by [employer], letter or phone within 4 business days after the clinic has received the results. If you do not hear from us within 7 days, please contact the clinic through [employer] or phone. If you have a critical or abnormal lab result, we will notify you by phone as soon as possible.  Submit refill requests through [employer] or call your pharmacy and they will forward the refill request to us. Please allow 3 business days for your refill to be completed.          Additional Information About Your Visit        [employer] Information     [employer] gives you secure access to your electronic health record. If you see a primary care provider, you can also send messages to your care team and make appointments. If you have questions, please call your primary care clinic.  If you do not have a primary care provider, please call [phone] and they will assist you.      [employer] is an electronic gateway that provides easy, online access to your medical records. With [employer], you can request a clinic appointment, read your test results, renew a prescription or  communicate with your care team.     To access your existing account, please contact your [facility] or call [phone] for assistance.        Care EveryWhere ID     This is your Care EveryWhere ID. This could be used by other organizations to access your [place] medical records  [id number]         Blood Pressure from Last 3 Encounters:   02/10/17 128/82   12/28/16 126/78   11/02/16 125/79    Weight from Last 3 Encounters:   02/10/17 99.4 kg (219 lb 3.2 oz)   11/02/16 104.8 kg (231 lb 0.7 oz)   10/26/16 105.4 kg (232 lb 4.8 oz)              We Performed the Following     General PFT Lab (Please always keep checked)     Pulmonary Function Test        Primary Care Provider Office Phone # Fax #    Janelle Rian Mari [phone]       [address] [address]        Thank you!     Thank you for choosing [facility]  for your care. Our goal is always to provide you with excellent care. Hearing back from our patients is one way we can continue to improve our services. Please take a few minutes to complete the written survey that you may receive in the mail after your visit with us. Thank you!             Your Updated Medication List - Protect others around you: Learn how to safely use, store and throw away your medicines at www.disposemymeds.org.          This list is accurate as of: 5/2/17  8:49 AM.  Always use your most recent med list.                   Brand Name Dispense Instructions for use    amLODIPine-benazepril 10-40 MG per capsule    LOTREL    90 capsule    TAKE 1 CAPSULE BY MOUTH ONCE DAILY       cyanocobalamin 1000 MCG/ML injection    VITAMIN B12    1 mL    1 ml IM monthly       fluocinonide 0.05 % cream    LIDEX    60 g    APPLY TO THE AFFECTED AREA TWICE DAILY AS NEEDED       magnesium chloride 535 (64 MG) MG Tbcr CR tablet     100 tablet    Take 1 tablet (535 mg) by mouth daily        nystatin cream    MYCOSTATIN    30 g    Apply topically 3 times daily       omeprazole 20 MG CR capsule    priLOSEC    180 capsule    TAKE TWO CAPSULES BY MOUTH DAILY       sodium chloride 1 GM tablet     30 tablet    TAKE 1 TABLET(1 GRAM) BY MOUTH DAILY       Testosterone 10 MG/ACT (2%) Gel    FORTESTA    60 g    Place 2 pumps onto the skin daily Apply from dispenser to clean, dry, intact skin of the upper arms and shoulders.

## 2017-05-03 NOTE — PROGRESS NOTES
CHIEF COMPLAINT:  Consultation requested by Janelle Mari MD for the evaluation of possible obstructive lung disease and hypoxemia in the hospital.      HISTORY OF PRESENT ILLNESS:  Alexi Oviedo is a 72-year-old gentleman with a long smoking history, continues to smoke.  Last fall he was hospitalized after a fall for fractures.  During the hospitalization he did have hypoxemia and was transferred to the ICU for 1 night.  The hypoxemia was thought to be exacerbated by narcotics and improved when narcotics were decreased and discontinued.  He was not discharged on supplemental oxygen.  He also recalls being told that he may have high carbon dioxide levels based on blood testing.  He denies having a lot of daytime symptoms of lung disease.  He essentially has intermittent cough that is occasionally productive, but usually dry.  He also attributes this to one of his medications.  He has some shortness of breath with walking long distances.  The example he provides is walking [place] one concourse [place].  Also, with stairs.  He denies a lot of problems with wheezing.  No history of hemoptysis.  He denies waking up at night with shortness of breath.  He states that he has been told about stopping breathing but does not have snoring and he denies any problems with his sleep quality.  He usually goes to bed around 8:00 p.m. or earlier and wakes up spontaneously around 4:00 to 4:30 and then takes a nap again from 7:30 to 8:30.  He has a long history of being an early bird with his sleep pattern.  He is not currently on any pain medication.  He continues to smoke about a pack a day, although he states he smokes about half a cigarette at a time discarding the second half.  He does not know of any occupational exposures.  He denies any unusual hobbies.  He was provided a pacemaker in the hospital, his arrhythmia was thought to potentially trigger his fall.  He denies waking up with confusion or headaches in  the morning.  He does have some rhinitis which he thinks is related to allergy, over-the-counter allergy medications is not really helping.  He does not think that is triggering his cough.      PAST MEDICAL HISTORY:   1.  Hypertension.   2.  Hyperlipidemia.   3.  Smoker.   4.  SIADH.   5.  Recent humeral fracture.   6.  History of second-degree AV block type 2, status post pacemaker.      ALLERGIES:  Include hydrochlorothiazide.      MEDICATIONS:   1.  Omeprazole 20 mg daily.   2.  Amlodipine/benazepril 10/20 daily.   3.  Testosterone gel.     Otherwise see EMR for remainder of supplements.      SOCIAL HISTORY:  He worked as a  for many years, also was in the [employer] for many years.  Denies exposures.  No pets.  He lives with his wife.  He drinks several glasses of wine daily.  No history of drug use.      FAMILY HISTORY:  Mother  at age 89.  She had hypertension.  Father had coronary disease and hypertension,  in early 80s.  Sister has polycystic kidney disease, status post kidney transplant.  His children are healthy.      REVIEW OF SYSTEMS:  Comprehensive review of systems is obtained and is notable for the items in the HPI, otherwise negative.      PHYSICAL EXAMINATION:   GENERAL:  Pleasant, alert gentleman in no distress.   VITAL SIGNS:  Blood pressure 104/69, pulse of 82, respirations 16.  O2 saturation is 94% on room air.  Temperature is 98.4.  Weight is 229 pounds, for a body mass index of 30.   HEENT:  Normocephalic, atraumatic.  Pupils are equal, reactive to light.  Sclerae are mildly injected.  Oropharynx is without ulcerations.  Mallampati IV.  Unable to visualize the posterior pharynx.   NECK:  Supple, without lymphadenopathy.  Neck circumference is 17.5 inches.   CHEST:  With severely decreased breath sounds bilaterally.  No rales or wheezes identified.   CARDIAC:  Regular rate and rhythm.  S1, S2 normal.   ABDOMEN:  Obese, soft and nontender.   EXTREMITIES:  Perfused.     SKIN:  His  skin is actually hyperemic appearing.  There is trace ankle edema.  No clubbing.      PULMONARY FUNCTION TESTING:  Performed today reveals forced vital capacity of 2.82, which is 62% predicted, FEV1 of 1.45, which is 42% predicted, ratio of 51.  Total lung capacity increased at 9.2 liters, which is 122% predicted, increased RV and RV TLC ratio suggests hyperinflation.  Diffusion capacity normal at 90% predicted.  After bronchodilator there is a significant increase in both the FEV1 and FVC with the FEV1 improving to 1.89.   Finding consistent with severe obstructive ventilatory defect with hyperinflation pre bronchodilator.     IMAGING:  Chest x-ray from November as well as from 05/2015 was personally reviewed and reveals findings of hyperinflation but no evidence for interstitial lung disease.  There is also a CT of the abdomen and pelvis from 2014, personally reviewed the lung cuts and some minimal emphysema noted at bases.      LABORATORY:  Bicarb on his metabolic panel is in the 33-36 range in the last couple months, some abnormal arterial blood gases from 11/2016 likely associated with narcotic use in the hospital with initial ABG of 7.13, pCO2 of 92, pO2 of 63, repeat gas later on was pH of 7.22, pCO2 of 68, pO2 of 59.      ASSESSMENT AND PLAN:  A 72-year-old smoker with moderately severe obstructive lung disease, normal diffusions suggest not a lot of emphysema; however, hypoxemia and hyperinflation suggestive of a hypercapnic picture which is likely chronic given his elevated bicarbonate on his panel.  The patient is not currently on any inhaled medication.  He is up-to-date on his vaccinations.  He is not getting regular exercise and has no intention or interest in quitting smoking.      PLAN:  I did  the patient about COPD and his pathophysiology, also discussed his increased risk for hypoxemia and hypoventilation and that his lung disease is suggestive of this pattern.  The patient declined doing a  walk test today.  He also declined further evaluation of possible sleep disordered breathing with PSG or even an overnight home pulse oximeter at this point.  He was interested in using an inhaler and Advair was prescribed (appears to be on his formulary).  He is counseled to rinse his mouth out afterwards and stay on the medication until followup.  Recommended a followup in 3 months with spirometry.  At that time we will readdress breathing issues, particularly as chronic respiratory failure, however, I suspect that he is not going to be interested in further evaluation and treatment.  The patient is going to be instructed on proper inhaler use prior to leaving the clinic today.  He was offered a lung cancer screening program with CT and declined, offered pulmonary rehab and declined and offered smoking cessation information and declined.  I urge his other caregivers to reinforce these recommendations.         SHIKHA PECK MD             D: 2017 10:07   T: 2017 05:27   MT: MARIELLE#[id number]      Name:     CHALO PENDLETON   MRN:      [id number]        Account:      [id number]   :      1945           Visit Date:   2017      Document: [id number]

## 2017-05-04 PROBLEM — J44.9 CHRONIC OBSTRUCTIVE PULMONARY DISEASE, UNSPECIFIED COPD TYPE (H): Status: ACTIVE | Noted: 2017-01-01

## 2017-05-30 NOTE — TELEPHONE ENCOUNTER
omeprazole (PRILOSEC) 20 MG capsule      Last Written Prescription Date: 11/23/16  Last Fill Quantity: 180,  # refills: 1   Last Office Visit with [facility], [facility] or [facility] prescribing provider: 2/10/17                                         Next 5 appointments (look out 90 days)     Aug 08, 2017  8:00 AM CDT   [facility]    [address] [place] [id number]   [phone]            Aug 08, 2017  9:30 AM CDT   [facility] with [facility]ece [facility]   [facility]    [address] [place] [address]   [phone]

## 2017-06-03 NOTE — TELEPHONE ENCOUNTER
Prescription approved per [facility] Refill Protocol.  ABHILASH Flores, Clinical RN Narda Mustafa.

## 2017-06-20 NOTE — TELEPHONE ENCOUNTER
[facility] Call Center    Phone Message    Name of Caller: Lexy    Phone Number: [phone]     Best time to return call: any    May a detailed message be left on voicemail: yes    Relation to patient: Other Name: Lexy  Relationship: wife  Is there legal documentation in chart to discuss information with this person: No:  Gather information or concern from the caller.  Document in the note but do NOT release any information to the person(s).  Then send message to appropriate person, as requested by the caller.      Reason for Call: Patient wanted to let the clinic know that they their Ins, , is stating that they need to have his meds through [facility] now, patient stated that [facility] will be calling requesting refill before his appt on 08/08    Action Taken: Message routed to:  Adult Clinics: Pulmonology p [id number]

## 2017-06-20 NOTE — TELEPHONE ENCOUNTER
Called and spoke to pt's wife Lexy. Verified ATD in chart. She states their insurance is requiring them to use [facility] for Advair and [facility] cannot transfer Rx, instead they were advised to contact clinic and request new Rx be sent. Rx sent via e-prescribe to pt's preferred pharmacy and Lexy reminded of upcoming appts on 8/8/17. Lexy denies any further questions or concerns at this time.    Latasha SHI RN, BSN  Pulmonary Care Coordinator

## 2017-08-08 NOTE — PROGRESS NOTES
Chief complaint: Follow-up of COPD    History of present illness: 72-year-old gentleman with long smoking history, COPD. Recent respiratory failure in the hospital associated with sedation. Here today for 3 month follow-up after initiating regular use of inhaled corticosteroid and long-acting beta agonist. He is using Advair 1 puff twice daily, the 500-50 dose. He is here today with his wife. He has noticed no problems tolerating the medication. He is rinsing his mouth out. He feels that it's helping his breathing. His wife has noticed that he has decreased wheezing and decreased mucus. He continues to smoke about half pack a day. No intention of quitting smoking. He does not have an albuterol inhaler at home. He did do a 6 minute walk today he walked slowly and had to stop for hip pain not for breathing. He did not have any hypoxemia. CAT score is 5 which is low minimal symptoms. He is not taking any naps. He is not waking up at night coughing, gasping, or choking. He snores, no observed apnea.  CAT: (0 = good, 5 = bad)  Cough 1   Sputum 1  Chest tightness 0  Exercise tolerance 2  ADLS 0  Confidence 0  Sleep 0  Energy 1    Total= 5    Past medical history, medications, allergies reviewed    Social history, see history of present illness    Review of systems: Detailed review of systems is obtained and is notable for the items in the history of present illness otherwise negative    Exam: Pleasant gentleman in no distress  /77 (BP Location: Left arm, Patient Position: Chair, Cuff Size: Adult Large)  Pulse 71  Temp 98.9  F (37.2  C) (Oral)  Resp 24  Ht 1.829 m (6')  Wt 104.1 kg (229 lb 8 oz)  SpO2 97%  BMI 31.13 kg/m2   HEENT: Normocephalic, atraumatic, pupils are reactive  Oropharynx: No evidence for erythema or thrush  Neck: Supple without lymphadenopathy  Chest: Decreased breath sounds with rhonchi at left base that shifts with cough  Cardiac: Regular rate and rhythm S1-S2 normal  Abdomen: Obese  nontender  Extremities are perfused, and no edema    Pulmonary function testing performed today:  Forced vital capacity of 3.31 L which is 73% predicted  FEV1 of 1.78 which is 52% predicted  Ratio of 54  Total lung capacity of 132% predicted  Diffusion capacity normal at 82% predicted  Moderately severe airflow obstruction with hyperinflation and normal diffusion  Compared to previous PFTs there is been a significant increase in the FEV1 and FVC    Assessment: 72-year-old smoker with moderately severe COPD relatively mild symptoms. He is getting significant clinical benefit from the use of inhaled corticosteroid and long-acting beta agonist. He declines the offer of pulmonary rehabilitation and lung cancer screening.    Plan: I did offer albuterol inhaler to be used as needed, and prior to exertion. He did accept that. Prescription was generated. As above he declined offers to assist in smoking cessation, referral for pulmonary rehabilitation,and lung cancer screening. He also declined smoking cessation efforts. He will continue to work with his primary care physician regarding these should he change his mind. Overnight oximetry was also declined. He'll follow-up with me in 9 months. At that time consider stepping down to the 250/50 dose.    Lorri Reece M.D.  Pulmonary/Critical Care/Sleep Medicine    The above note was dictated using voice recognition software and may include typographical errors. Please contact the author for any clarifications.

## 2017-08-08 NOTE — NURSING NOTE
Alexi Oviedo's goals for this visit include: COPD  He requests these members of his care team be copied on today's visit information: yes    PCP: Janelle Mari    Referring Provider:  ESTABLISHED PATIENT  No address on file    Chief Complaint   Patient presents with     COPD       Initial /77 (BP Location: Left arm, Patient Position: Chair, Cuff Size: Adult Large)  Pulse 71  Temp 98.9  F (37.2  C) (Oral)  Resp 24  Ht 1.829 m (6')  Wt 104.1 kg (229 lb 8 oz)  SpO2 97%  BMI 31.13 kg/m2 Estimated body mass index is 31.13 kg/(m^2) as calculated from the following:    Height as of this encounter: 1.829 m (6').    Weight as of this encounter: 104.1 kg (229 lb 8 oz).  Medication Reconciliation: complete    Do you need any medication refills at today's visit? no

## 2017-08-08 NOTE — MR AVS SNAPSHOT
After Visit Summary   8/8/2017    Alexi Oviedo    MRN: [id number]           Patient Information     Date Of Birth          1945        Visit Information        Provider Department      8/8/2017 9:30 AM Lorri Reece MD [facility]        Today's Diagnoses     Chronic obstructive pulmonary disease, unspecified COPD type (H)    -  1       Follow-ups after your visit        Follow-up notes from your care team     Return in about 9 months (around 5/8/2018).      Your next 10 appointments already scheduled     Aug 10, 2017  9:30 AM CDT   Lab visit with [facility] LAB   [facility]    [address] [facility] [address] [phone]           Please do not eat 10-12 hours before your appointment if you are coming in fasting for labs on lipids, cholesterol, or glucose (sugar). Does not apply to pregnant women.  Water with medications is okay. Do not drink coffee or other fluids.  If you have concerns about taking your medications, please send a message by clicking on Secure Messaging, Message Your Care Team.            May 08, 2018  9:30 AM CDT   Return Visit with Lorri Reece MD   [facility]    [address] [place] [address] [phone]              Future tests that were ordered for you today     Open Future Orders        Priority Expected Expires Ordered    RESPIRATORY FLOW VOLUME LOOP Routine 8/8/2017 8/8/2018 8/8/2017    HC PLETHYSMOGRAPHY LUNG VOLUMES W/WO AIRWAY RESIST Routine 8/8/2017 8/8/2018 8/8/2017    PULMONARY STRESS TEST, SIMPLE Routine 8/8/2017 9/22/2017 8/8/2017            Who to contact     If you have questions or need follow up information about today's clinic visit or your schedule please contact [facility] directly at [phone].  Normal or non-critical lab and imaging results will be  communicated to you by Netaplanhart, letter or phone within 4 business days after the clinic has received the results. If you do not hear from us within 7 days, please contact the clinic through Hello Chair or phone. If you have a critical or abnormal lab result, we will notify you by phone as soon as possible.  Submit refill requests through Hello Chair or call your pharmacy and they will forward the refill request to us. Please allow 3 business days for your refill to be completed.          Additional Information About Your Visit        Hello Chair Information     Hello Chair gives you secure access to your electronic health record. If you see a primary care provider, you can also send messages to your care team and make appointments. If you have questions, please call your primary care clinic.  If you do not have a primary care provider, please call [phone] and they will assist you.      Hello Chair is an electronic gateway that provides easy, online access to your medical records. With Hello Chair, you can request a clinic appointment, read your test results, renew a prescription or communicate with your care team.     To access your existing account, please contact your [facility] or call [phone] for assistance.        Care EveryWhere ID     This is your Care EveryWhere ID. This could be used by other organizations to access your [facility] medical records  [id number]        Your Vitals Were     Pulse Temperature Respirations Height Pulse Oximetry BMI (Body Mass Index)    71 98.9  F (37.2  C) (Oral) 24 1.829 m (6') 97% 31.13 kg/m2       Blood Pressure from Last 3 Encounters:   08/08/17 145/77   05/02/17 104/69   02/10/17 128/82    Weight from Last 3 Encounters:   08/08/17 104.1 kg (229 lb 8 oz)   05/02/17 104.2 kg (229 lb 12.8 oz)   02/10/17 99.4 kg (219 lb 3.2 oz)              Today, you had the following     No orders found for display         Today's Medication Changes          These changes are  accurate as of: 8/8/17 10:06 AM.  If you have any questions, ask your nurse or doctor.               Start taking these medicines.        Dose/Directions    albuterol 108 (90 BASE) MCG/ACT Inhaler   Commonly known as:  PROAIR HFA/PROVENTIL HFA/VENTOLIN HFA   Used for:  Chronic obstructive pulmonary disease, unspecified COPD type (H)   Started by:  Lorri Reece MD        Dose:  2 puff   Inhale 2 puffs into the lungs every 6 hours as needed for shortness of breath / dyspnea or wheezing   Quantity:  3 Inhaler   Refills:  3            Where to get your medicines      These medications were sent to [facility] - [address]  [address] [facility] [address]     Phone:  [phone]     albuterol 108 (90 BASE) MCG/ACT Inhaler                Primary Care Provider Office Phone # Fax #    Janelle Rian Mari [phone]       [address] [address]        Equal Access to Services     [facility]: Hadii aad ku hadasho Soomaali, waaxda luqadaha, qaybta kaalmada adeegyada, waxay dino hayniharika harrington . So [facility] [phone].    ATENCIÓN: Si habla español, tiene a keen disposición servicios gratuitos de asistencia lingüística. Llame al [phone].    We comply with applicable federal civil rights laws and [place] laws. We do not discriminate on the basis of race, color, national origin, age, disability sex, sexual orientation or gender identity.            Thank you!     Thank you for choosing [facility]  for your care. Our goal is always to provide you with excellent care. Hearing back from our patients is one way we can continue to improve our services. Please take a few minutes to complete the written survey that you may receive in the mail after your visit with us. Thank you!             Your Updated Medication List - Protect others around you: Learn how to  safely use, store and throw away your medicines at www.disposemymeds.org.          This list is accurate as of: 8/8/17 10:06 AM.  Always use your most recent med list.                   Brand Name Dispense Instructions for use Diagnosis    albuterol 108 (90 BASE) MCG/ACT Inhaler    PROAIR HFA/PROVENTIL HFA/VENTOLIN HFA    3 Inhaler    Inhale 2 puffs into the lungs every 6 hours as needed for shortness of breath / dyspnea or wheezing    Chronic obstructive pulmonary disease, unspecified COPD type (H)       amLODIPine-benazepril 10-40 MG per capsule    LOTREL    90 capsule    TAKE 1 CAPSULE BY MOUTH ONCE DAILY    Essential hypertension with goal blood pressure less than 140/90       cyanocobalamin 1000 MCG/ML injection    VITAMIN B12    1 mL    1 ml IM monthly    Fatigue, unspecified type       fluocinonide 0.05 % cream    LIDEX    60 g    APPLY TO THE AFFECTED AREA TWICE DAILY AS NEEDED    Dermatitis       fluticasone-salmeterol 500-50 MCG/DOSE diskus inhaler    ADVAIR    3 Inhaler    Inhale 1 puff into the lungs every 12 hours    Chronic obstructive pulmonary disease, unspecified COPD type (H)       magnesium chloride 535 (64 MG) MG Tbcr CR tablet     100 tablet    Take 1 tablet (535 mg) by mouth daily    Hypomagnesemia       ranitidine 150 MG tablet    ZANTAC    180 tablet    Take 1 tablet (150 mg) by mouth 2 times daily    Dyspepsia       sodium chloride 1 GM tablet     30 tablet    TAKE 1 TABLET(1 GRAM) BY MOUTH DAILY    SIADH (syndrome of inappropriate ADH production) (H)       Testosterone 10 MG/ACT (2%) Gel    FORTESTA    60 g    Place 2 pumps onto the skin daily Apply from dispenser to clean, dry, intact skin of the upper arms and shoulders.    Male hypogonadism

## 2017-08-08 NOTE — MR AVS SNAPSHOT
After Visit Summary   8/8/2017    Alexi Oviedo    MRN: [id number]           Patient Information     Date Of Birth          1945        Visit Information        Provider Department      8/8/2017 8:00 AM PFT LAB [facility]        Today's Diagnoses     Chronic obstructive pulmonary disease, unspecified COPD type (H)           Follow-ups after your visit        Your next 10 appointments already scheduled     Aug 08, 2017  9:30 AM CDT   [facility]   [facility]    [address] [address] [place] [address]   [phone]            Aug 10, 2017  9:30 AM CDT   Lab visit with BK LAB   [facility]    [address] [facility] [id number] [phone]           Please do not eat 10-12 hours before your appointment if you are coming in fasting for labs on lipids, cholesterol, or glucose (sugar). Does not apply to pregnant women.  Water with medications is okay. Do not drink coffee or other fluids.  If you have concerns about taking your medications, please send a message by clicking on Secure Messaging, Message Your Care Team.              Future tests that were ordered for you today     Open Future Orders        Priority Expected Expires Ordered    RESPIRATORY FLOW VOLUME LOOP Routine 8/8/2017 8/8/2018 8/8/2017    HC PLETHYSMOGRAPHY LUNG VOLUMES W/WO AIRWAY RESIST Routine 8/8/2017 8/8/2018 8/8/2017    PULMONARY STRESS TEST, SIMPLE Routine 8/8/2017 9/22/2017 8/8/2017            Who to contact     If you have questions or need follow up information about today's clinic visit or your schedule please contact [facility] directly at [phone].  Normal or non-critical lab and imaging results will be communicated to you by MyChart, letter or phone within 4 business days after the clinic has received the results. If you do  not hear from us within 7 days, please contact the clinic through [employer] or phone. If you have a critical or abnormal lab result, we will notify you by phone as soon as possible.  Submit refill requests through [employer] or call your pharmacy and they will forward the refill request to us. Please allow 3 business days for your refill to be completed.          Additional Information About Your Visit        [email or web address] Information     [employer] gives you secure access to your electronic health record. If you see a primary care provider, you can also send messages to your care team and make appointments. If you have questions, please call your primary care clinic.  If you do not have a primary care provider, please call [phone] and they will assist you.      [employer] is an electronic gateway that provides easy, online access to your medical records. With [employer], you can request a clinic appointment, read your test results, renew a prescription or communicate with your care team.     To access your existing account, please contact your [facility] or call [phone] for assistance.        Care EveryWhere ID     This is your Care EveryWhere ID. This could be used by other organizations to access your [place] medical records  [id number]         Blood Pressure from Last 3 Encounters:   05/02/17 104/69   02/10/17 128/82   12/28/16 126/78    Weight from Last 3 Encounters:   05/02/17 104.2 kg (229 lb 12.8 oz)   02/10/17 99.4 kg (219 lb 3.2 oz)   11/02/16 104.8 kg (231 lb 0.7 oz)              We Performed the Following     6 minute walk test     General PFT Lab (Please always keep checked)     General PFT Lab (Please always keep checked)     HC DIFFUSING CAPACITY     Pulmonary Function Test        Primary Care Provider Office Phone # Fax #    Janelle Rian Mari [phone]       [address] [address]        Equal Access to  Services     [facility]: Hadii aad ku hadkarlienini Jacquelinetabatha, waaxda luqadaha, qaybta kaalmada marilee, anthony harrington . So [facility] [phone].    ATENCIÓN: Si ferla katie, tiene a keen disposición servicios gratuitos de asistencia lingüística. Llame al [phone].    We comply with applicable federal civil rights laws and [place] laws. We do not discriminate on the basis of race, color, national origin, age, disability sex, sexual orientation or gender identity.            Thank you!     Thank you for choosing [facility]  for your care. Our goal is always to provide you with excellent care. Hearing back from our patients is one way we can continue to improve our services. Please take a few minutes to complete the written survey that you may receive in the mail after your visit with us. Thank you!             Your Updated Medication List - Protect others around you: Learn how to safely use, store and throw away your medicines at www.disposemymeds.org.          This list is accurate as of: 8/8/17  9:14 AM.  Always use your most recent med list.                   Brand Name Dispense Instructions for use Diagnosis    amLODIPine-benazepril 10-40 MG per capsule    LOTREL    90 capsule    TAKE 1 CAPSULE BY MOUTH ONCE DAILY    Essential hypertension with goal blood pressure less than 140/90       cyanocobalamin 1000 MCG/ML injection    VITAMIN B12    1 mL    1 ml IM monthly    Fatigue, unspecified type       fluocinonide 0.05 % cream    LIDEX    60 g    APPLY TO THE AFFECTED AREA TWICE DAILY AS NEEDED    Dermatitis       fluticasone-salmeterol 500-50 MCG/DOSE diskus inhaler    ADVAIR    3 Inhaler    Inhale 1 puff into the lungs every 12 hours    Chronic obstructive pulmonary disease, unspecified COPD type (H)       magnesium chloride 535 (64 MG) MG Tbcr CR tablet     100 tablet    Take 1 tablet (535 mg) by mouth daily    Hypomagnesemia       ranitidine 150 MG tablet    ZANTAC     180 tablet    Take 1 tablet (150 mg) by mouth 2 times daily    Dyspepsia       sodium chloride 1 GM tablet     30 tablet    TAKE 1 TABLET(1 GRAM) BY MOUTH DAILY    SIADH (syndrome of inappropriate ADH production) (H)       Testosterone 10 MG/ACT (2%) Gel    FORTESTA    60 g    Place 2 pumps onto the skin daily Apply from dispenser to clean, dry, intact skin of the upper arms and shoulders.    Male hypogonadism

## 2017-08-15 NOTE — TELEPHONE ENCOUNTER
sodium chloride 1 GM tablet      Last Written Prescription Date:  04/11/17  Last Fill Quantity: 30,   # refills: 2  Last Office Visit with [facility], [facility] or [facility] prescribing provider: 02/10/17 Dr. Mari  Future Office visit:    Next 5 appointments (look out 90 days)     Sep 18, 2017 10:00 AM CDT   Vidhi Gonzalez with Janelle Mari MD   [facility] ([address] [id number] [phone]                   Routing refill request to provider for review/approval because:  Drug not on the [facility], [facility] or [facility] refill protocol or controlled substance

## 2017-09-18 NOTE — NURSING NOTE
Chief Complaint   Patient presents with     Recheck Medication       Initial /80 (BP Location: Right arm, Patient Position: Right side, Cuff Size: Adult Large)  Pulse 74  Temp 98.6  F (37  C) (Oral)  Resp 16  Ht 1.829 m (6')  Wt 106.7 kg (235 lb 4.8 oz)  SpO2 94%  BMI 31.91 kg/m2 Estimated body mass index is 31.91 kg/(m^2) as calculated from the following:    Height as of this encounter: 1.829 m (6').    Weight as of this encounter: 106.7 kg (235 lb 4.8 oz).  Medication Reconciliation: complete     Deandre Reyes MA

## 2017-09-18 NOTE — PROGRESS NOTES
SUBJECTIVE:   Alexi Oviedo is a 72 year old male who presents to clinic today for the following health issues:    1. Sore behind L breast - for the past 3 weeks  - pt states hasn't been on Testosterone for the past 3 months - pt restarted on 9/16/17    Hyperlipidemia Follow-Up      Rate your low fat/cholesterol diet?: fair    Taking statin?  No    Other lipid medications/supplements?:  none    Hypertension Follow-up      Outpatient blood pressures are not being checked.    Low Salt Diet: discuss sodium -- pt taking full tablet daily again        Amount of exercise or physical activity: None    Problems taking medications regularly: No    Medication side effects: none  Diet: regular (no restrictions)    SUBJECTIVE:  Here today for follow-up of routine issues including hypertension, lipids, sodium level. Had been taking one sodium tablet daily and sodium level was 137. Cutback to essentially half a tablet a day and it dropped down to 131. He did feel slightly lightheaded during that time. Back full pill a day. Was off testosterone for a number of months and he began noticing some left-sided breast tenderness. Started back on his testosterone a few days ago. Continues to smoke and we discussed his known diagnosis of COPD. Provided some counseling and resources on smoking cessation. He is, at best, in the pre-contemplative stage of cessation. Would like a flu shot today heard also complains of itchy scalp with some frequency and this causes some bumps to bleed at night.    Review of systems otherwise negative.  Past medical, family, and social history reviewed and updated in chart.    OBJECTIVE:  /80 (BP Location: Right arm, Patient Position: Right side, Cuff Size: Adult Large)  Pulse 74  Temp 98.6  F (37  C) (Oral)  Resp 16  Ht 1.829 m (6')  Wt 106.7 kg (235 lb 4.8 oz)  SpO2 94%  BMI 31.91 kg/m2  Alert, pleasant, upbeat, and in no apparent discomfort.  Skin - nonspecific excoriated papules on back of  scalp. Nothing concerning  S1 and S2 normal, no murmurs, clicks, gallops or rubs. Regular rate and rhythm. Chest is clear; no wheezes or rales. No edema or JVD.  Breasts - no significant gynecomastia or breast buds noted  Past labs reviewed with the patient.     ASSESSMENT / PLAN:  (E22.2) SIADH (syndrome of inappropriate ADH production) (H)  (primary encounter diagnosis)  Comment: We'll recheck sodium level and continue treatment as indicated  Plan: Comprehensive metabolic panel            (J44.9) Chronic obstructive pulmonary disease, unspecified COPD type (H)  Comment: Again counseled on smoking cessation  Plan:     (E29.1) Male hypogonadism  Comment: Refill. No point of rechecking level at this point as he has not been on therapy  Plan: Testosterone (FORTESTA) 10 MG/ACT (2%) GEL            (I10) Essential hypertension with goal blood pressure less than 140/90  Comment:   Plan: Comprehensive metabolic panel, Lipid panel         reflex to direct LDL            (E78.5) Hyperlipidemia LDL goal <130  Comment:   Plan: Comprehensive metabolic panel, Lipid panel         reflex to direct LDL            (L30.9) Dermatitis  Comment: Discussed mechanism of action of the proposed medication, as well as potential effects, both good and bad.  Patient expressed understanding and agreed with treatment.   Plan: fluocinonide (LIDEX) 0.05 % solution            (Z72.0) Tobacco abuse  Comment:   Plan: Tobacco Cessation - for Health Maintenance            (Z23) Need for prophylactic vaccination and inoculation against influenza  Comment:   Plan: FLU VACCINE, INCREASED ANTIGEN, PRESV FREE, AGE        65+ [79389], Vaccine Administration, Initial         [57083]            Follow up 6 months or based on results  PETAR Mari MD    (Chart documentation completed in part with Dragon voice-recognition software.  Even though reviewed some grammatical, spelling, and word errors may remain.)       Injectable Influenza Immunization  Documentation    1.  Is the person to be vaccinated sick today?     2. Does the person to be vaccinated have an allergy to a component   of the vaccine?     3. Has the person to be vaccinated ever had a serious reaction   to influenza vaccine in the past?     4. Has the person to be vaccinated ever had Guillain-Barré syndrome?     Form completed by Deandre Reyes

## 2017-09-18 NOTE — PATIENT INSTRUCTIONS

## 2017-09-20 NOTE — TELEPHONE ENCOUNTER
Received PA form for pt's Testosterone - placed on Dr. Mari's desk for review    Will Amy GONZÁLES

## 2017-09-20 NOTE — TELEPHONE ENCOUNTER
Form completed / signed.  In fax basket.     Does not look like he meets the criteria, which I think we have known from previous PA's.  But I think he is paying for this on his own

## 2017-09-22 NOTE — TELEPHONE ENCOUNTER
Received another PA form with further questions - placed on Dr. Mari's desk for review    Will Amy GONZÁLES

## 2017-09-25 NOTE — TELEPHONE ENCOUNTER
fluocinonide (LIDEX) 0.05 % solution      Last Written Prescription Date: 9/18/17  Last Fill Quantity: 60ml,  # refills: 2   Last Office Visit with [facility], [facility] or [facility] prescribing provider: 9/18/17

## 2017-09-28 NOTE — TELEPHONE ENCOUNTER
Prior Auth (encounter 9/20/17) faxed again on 9/27/27- still in process.   Rx pended for provider review.

## 2017-10-09 NOTE — TELEPHONE ENCOUNTER
Prior authorization has been approved    9/9/17 - lifetime  Case ID # [id number]    Deandre Reyes MA

## 2017-10-25 NOTE — TELEPHONE ENCOUNTER
nystatin (MYCOSTATIN) cream (Discontinued)      Last Written Prescription Date: 2/10/17  Last Fill Quantity: 30g,  # refills: 1   Last Office Visit with [facility], [facility] or [facility] prescribing provider: 9/18/17                                         Next 5 appointments (look out 90 days)     Nov 17, 2017 10:00 AM CST   Lab visit with BK LAB   [facility] ([facility])    [address] [place] [address] [phone]

## 2017-11-13 NOTE — MR AVS SNAPSHOT
After Visit Summary   11/13/2017    Alexi Oviedo    MRN: [id number]           Patient Information     Date Of Birth          1945        Visit Information        Provider Department      11/13/2017 10:40 AM Janelle Mari MD [facility]        Today's Diagnoses     Hives due to cold exposure    -  1    SIADH (syndrome of inappropriate ADH production) (H)        Chronic obstructive pulmonary disease, unspecified COPD type (H)           Follow-ups after your visit        Follow-up notes from your care team     Return if symptoms worsen or fail to improve.      Your next 10 appointments already scheduled     Nov 17, 2017 10:00 AM CST   [facility]    [address] [facility] [address] [phone]           Please do not eat 10-12 hours before your appointment if you are coming in fasting for labs on lipids, cholesterol, or glucose (sugar). Does not apply to pregnant women.  Water with medications is okay. Do not drink coffee or other fluids.  If you have concerns about taking your medications, please send a message by clicking on Secure Messaging, Message Your Care Team.            May 08, 2018  9:30 AM CDT   Return Visit with Lorri Reece MD   [facility]    [address] [place] [address] [phone]              Who to contact     If you have questions or need follow up information about today's clinic visit or your schedule please contact [facility] directly at [phone].  Normal or non-critical lab and imaging results will be communicated to you by MyChart, letter or phone within 4 business days after the clinic has received the results. If you do not hear from us within 7 days, please contact the clinic through MyChart or phone. If you have a critical or abnormal lab result, we  will notify you by phone as soon as possible.  Submit refill requests through [employer] or call your pharmacy and they will forward the refill request to us. Please allow 3 business days for your refill to be completed.          Additional Information About Your Visit        [employer] Information     [employer] gives you secure access to your electronic health record. If you see a primary care provider, you can also send messages to your care team and make appointments. If you have questions, please call your primary care clinic.  If you do not have a primary care provider, please call [phone] and they will assist you.        Care EveryWhere ID     This is your Care EveryWhere ID. This could be used by other organizations to access your [place] medical records  [id number]        Your Vitals Were     Pulse Temperature Pulse Oximetry BMI (Body Mass Index)          83 98.6  F (37  C) (Oral) 97% 32.33 kg/m2         Blood Pressure from Last 3 Encounters:   11/13/17 134/86   09/18/17 134/80   08/08/17 145/77    Weight from Last 3 Encounters:   11/13/17 108.1 kg (238 lb 6.4 oz)   09/18/17 106.7 kg (235 lb 4.8 oz)   08/08/17 104.1 kg (229 lb 8 oz)              We Performed the Following     Basic metabolic panel          Today's Medication Changes          These changes are accurate as of: 11/13/17 11:48 AM.  If you have any questions, ask your nurse or doctor.               Start taking these medicines.        Dose/Directions    loratadine 10 MG tablet   Commonly known as:  CLARITIN   Used for:  Hives due to cold exposure   Started by:  Janelle Mari MD        Dose:  10 mg   Take 1 tablet (10 mg) by mouth 2 times daily as needed for allergies   Quantity:  60 tablet   Refills:  2            Where to get your medicines      These medications were sent to [id number] - [place] - [address] [facility] [address], [place] [address]     Phone:  [phone]      loratadine 10 MG tablet                Primary Care Provider Office Phone # Fax #    Janelle Rian Mari [phone] [address] [facility] [address]        Equal Access to Services     JUVENTINO GILBERT : Kinjal scott ku ankitao Sojosephali, waaxda luqadaha, qaybta kaalmada adehallieyada, anthony hirsch juany larios. So [facility] [phone].    ATENCIÓN: Si habla español, tiene a keen disposición servicios gratuitos de asistencia lingüística. Llame al [phone].    We comply with applicable federal civil rights laws and [place] laws. We do not discriminate on the basis of race, color, national origin, age, disability, sex, sexual orientation, or gender identity.            Thank you!     Thank you for choosing [facility]  for your care. Our goal is always to provide you with excellent care. Hearing back from our patients is one way we can continue to improve our services. Please take a few minutes to complete the written survey that you may receive in the mail after your visit with us. Thank you!             Your Updated Medication List - Protect others around you: Learn how to safely use, store and throw away your medicines at www.disposemymeds.org.          This list is accurate as of: 11/13/17 11:48 AM.  Always use your most recent med list.                   Brand Name Dispense Instructions for use Diagnosis    albuterol 108 (90 BASE) MCG/ACT Inhaler    PROAIR HFA/PROVENTIL HFA/VENTOLIN HFA    3 Inhaler    Inhale 2 puffs into the lungs every 6 hours as needed for shortness of breath / dyspnea or wheezing    Chronic obstructive pulmonary disease, unspecified COPD type (H)       amLODIPine-benazepril 10-40 MG per capsule    LOTREL    90 capsule    TAKE 1 CAPSULE BY MOUTH ONCE DAILY    Essential hypertension with goal blood pressure less than 140/90       cyanocobalamin 1000 MCG/ML injection    VITAMIN B12    1 mL    1 ml IM monthly    Fatigue, unspecified  type       * fluocinonide 0.05 % solution    LIDEX    60 mL    Apply topically nightly as needed    Dermatitis       * fluocinonide 0.05 % cream    LIDEX    60 g    APPLY TO THE AFFECTED AREA TWICE DAILY AS NEEDED    Dermatitis       fluticasone-salmeterol 500-50 MCG/DOSE diskus inhaler    ADVAIR    3 Inhaler    Inhale 1 puff into the lungs every 12 hours    Chronic obstructive pulmonary disease, unspecified COPD type (H)       GOLD BOND EX           lidocaine 5% oint/silver sulfadiazine 1% cm/triamcinolone 0.1% cm compounded ointment    Alexander paste     Apply topically 4 times daily as needed        loratadine 10 MG tablet    CLARITIN    60 tablet    Take 1 tablet (10 mg) by mouth 2 times daily as needed for allergies    Hives due to cold exposure       magnesium chloride 535 (64 MG) MG Tbcr CR tablet     100 tablet    Take 1 tablet (535 mg) by mouth daily    Hypomagnesemia       nystatin cream    MYCOSTATIN    30 g    APPLY EXTERNALLY TO THE AFFECTED AREA THREE TIMES DAILY    Yeast dermatitis       omeprazole 20 MG CR capsule    priLOSEC    180 capsule    TAKE TWO CAPSULES BY MOUTH DAILY    Dyspepsia       sodium chloride 1 GM tablet     90 tablet    TAKE 1 TABLET(1 GRAM) BY MOUTH DAILY    SIADH (syndrome of inappropriate ADH production) (H)       Testosterone 10 MG/ACT (2%) Gel    FORTESTA    60 g    Place 2 pumps onto the skin daily Apply from dispenser to clean, dry, intact skin of the upper arms and shoulders.    Male hypogonadism       * Notice:  This list has 2 medication(s) that are the same as other medications prescribed for you. Read the directions carefully, and ask your doctor or other care provider to review them with you.

## 2017-11-13 NOTE — PROGRESS NOTES
SUBJECTIVE:   Alexi Oviedo is a 72 year old male who presents to clinic today for the following health issues:  Rash  Onset: ongoing 2-3 months    Description:   Location: whole body- started with back now spread to the rest of the body  Character: red  Itching (Pruritis): YES    Progression of Symptoms:  worsening    Accompanying Signs & Symptoms:  Fever: no   Body aches or joint pain: no   Sore throat symptoms: no   Recent cold symptoms: no     History:   Previous similar rash: YES- not as bad    Precipitating factors:   Exposure to similar rash: no   New exposures: None   Recent travel: no   Therapies Tried and outcome: Fluocinonide solution and cream- no improvement    SUBJECTIVE:  Here today for evaluation of the above rash. For years we have known about xerosis related to winter months. Has done well with topical cortisone. But recently the rash seems to be spreading and doesn't respond quite as well. Very itchy. He has been using over-the-counter anti-itch creams that do help with itching portion but the rash is still present. Carries a history of COPD that has not flared up recently. No known food allergies, exposures, etc.    Unrelatedly due for a recheck sodium level with his history of SIADH. Generally feeling fine with no excessive muscle cramps or fatigue    Review of systems otherwise negative.  Past medical, family, and social history reviewed and updated in chart.    OBJECTIVE:  /86 (BP Location: Right arm, Patient Position: Chair, Cuff Size: Adult Regular)  Pulse 83  Temp 98.6  F (37  C) (Oral)  Wt 108.1 kg (238 lb 6.4 oz)  SpO2 97%  BMI 32.33 kg/m2  Alert, pleasant, upbeat, and in no apparent discomfort.  Ears normal. Throat and pharynx normal. Neck supple. No adenopathy or masses in the neck or supraclavicular regions. Sinuses non tender.  Heart regular rate and rhythm without murmur  Lungs have decreased breath sounds throughout but are fairly clear  Skin - hyperemic throughout  trunk and arms and legs. No significant dermatographia. There is some underlying excoriation but is primarily a hypervascular appearance  Past labs reviewed with the patient.     ASSESSMENT / PLAN:  (L50.2) Hives due to cold exposure  (primary encounter diagnosis)  Comment: I think he is suffering from cold urticaria which may be related to or independent of his underlying xerosis. I discussed that the treatment for this is very different than what we do specifically for the skin. Topical agents will help symptomatically but not cure the underlying issue. As winter goes along he will likely adjust but I would start on some higher dose antihistamine therapy to stabilize his system. Consideration for a course of prednisone but I don't want to complicate things with his underlying COPD (would not want to go down the road of making him somewhat steroid dependent)  Plan: loratadine (CLARITIN) 10 MG tablet            (E22.2) SIADH (syndrome of inappropriate ADH production) (H)  Comment: Seems to be stable symptomatically and we will recheck his sodium level  Plan:     (J44.9) Chronic obstructive pulmonary disease, unspecified COPD type (H)  Comment: Discussion as above. We are monitoring this for now and will avoid steroids until needed  Plan:     Follow up as needed   PETAR Mari MD    (Chart documentation completed in part with Dragon voice-recognition software.  Even though reviewed some grammatical, spelling, and word errors may remain.)

## 2017-11-13 NOTE — NURSING NOTE
Chief Complaint   Patient presents with     Derm Problem       Initial /86 (BP Location: Right arm, Patient Position: Chair, Cuff Size: Adult Regular)  Pulse 83  Temp 98.6  F (37  C) (Oral)  Wt 108.1 kg (238 lb 6.4 oz)  SpO2 97%  BMI 32.33 kg/m2 Estimated body mass index is 32.33 kg/(m^2) as calculated from the following:    Height as of 9/18/17: 1.829 m (6').    Weight as of this encounter: 108.1 kg (238 lb 6.4 oz).  Medication Reconciliation: complete   Terri Espinal CMA

## 2017-11-15 NOTE — TELEPHONE ENCOUNTER
Routing refill request to provider for review/approval because:  Drug not on the [facility] refill protocol         Diana Amanda RN

## 2017-11-27 NOTE — TELEPHONE ENCOUNTER
predniSONE (DELTASONE) 20 MG tablet      Last Office Visit: 11/17/17  Last Written Prescription Date:  21  Last Fill Quantity: 0,   # refills: 11/13/17  Future Office visit:       Routing refill request to provider for review/approval because:  Drug not on the [facility], P or [facility] refill protocol or controlled substance

## 2017-12-27 NOTE — TELEPHONE ENCOUNTER
[facility] Call Center    Phone Message    Name of Caller: Lexy    Phone Number: Home number on file [phone] (home)    Best time to return call: Any    May a detailed message be left on voicemail: yes    Relation to patient: Other Name: Lexy  Relationship: Spouse  Is there legal documentation in chart to discuss information with this person: Yes      Reason for Call: Lexy called and said Alexi has a rash all over his body and swelling in his face.  They are questioning if the symptoms could be related to fluticasone-salmeterol (ADVAIR) 500-50 MCG/DOSE diskus inhaler.  He has seen his PCP but Alexi's breathing has also gotten worse.  Requesting a call to discuss.  Scheduled first available 2/13/18.  Thank you.     Action Taken: Message routed to:  Adult Clinics: Pulmonology p [phone]

## 2017-12-27 NOTE — TELEPHONE ENCOUNTER
Received message from Dr. Reece:   Lorri Reece MD Carey, Lauren, RN        Caller: Unspecified (Today,  9:11 AM)                     If he wants to stop advair and try something totally different that's okay- he should look art formulary, maybe Spiriva.     TC       Routing message sent back to Dr. Reece to clarify alternative medication options for patient.     Sury NASCIMENTO RN, BSN  Care Coordinator

## 2017-12-27 NOTE — TELEPHONE ENCOUNTER
"Returned call and spoke to patient. He states that he has had an ongoing red, itchy rash since this September. Located on his shoulders, back, breast area, both arms, and upper thighs (essentially his entire body). Patient also states that his face has begun to swell \"in the last month.\" He explains that he usually gets a rash in the fall when the seasons change, but it has never been this extreme. Patient saw his PCP on 11/13, where he was prescribed Claritin, skin creams/lotions, and Prednisone for hives r/t cold exposure. Patient states that the Prednisone was working the best, but he is no longer taking it. Patient reports he is still taking his Advair as prescribed (2x daily), and he does not notice any increase in SOB/dyspnea, or wheezing. He reports that his inhalers are still helping his breathing.     Patient is wondering if his Advair could be causing the rash, as he read the side effects and they are much like the symptoms he is experiencing. Patient explains, \"Maybe it is just now catching up to me.\" Advised patient to be seen by PCP again since symptoms have worsened/failed to improve in the last month. Also advised patient that Dr. Reece would be notified of symptoms to see if she has anymore recommendations for him. Patient in agreement with this plan.     Encounter routed to Dr. Reece for recommendations.     Sury NASCIMENTO, RN, BSN  Care Coordinator       "

## 2018-01-01 ENCOUNTER — OFFICE VISIT (OUTPATIENT)
Dept: FAMILY MEDICINE | Facility: CLINIC | Age: 73
End: 2018-01-01
Payer: MEDICARE

## 2018-01-01 ENCOUNTER — TRANSFERRED RECORDS (OUTPATIENT)
Dept: HEALTH INFORMATION MANAGEMENT | Facility: CLINIC | Age: 73
End: 2018-01-01

## 2018-01-01 ENCOUNTER — CARE COORDINATION (OUTPATIENT)
Dept: GASTROENTEROLOGY | Facility: CLINIC | Age: 73
End: 2018-01-01

## 2018-01-01 VITALS
WEIGHT: 244 LBS | SYSTOLIC BLOOD PRESSURE: 144 MMHG | HEIGHT: 72 IN | TEMPERATURE: 98.2 F | RESPIRATION RATE: 12 BRPM | DIASTOLIC BLOOD PRESSURE: 86 MMHG | BODY MASS INDEX: 33.05 KG/M2

## 2018-01-01 DIAGNOSIS — L85.3 XEROSIS CUTIS: Primary | ICD-10-CM

## 2018-01-01 DIAGNOSIS — L50.2 HIVES DUE TO COLD EXPOSURE: ICD-10-CM

## 2018-01-01 DIAGNOSIS — R10.13 DYSPEPSIA: ICD-10-CM

## 2018-01-01 LAB — EJECTION FRACTION: 63

## 2018-01-01 PROCEDURE — 99214 OFFICE O/P EST MOD 30 MIN: CPT | Performed by: FAMILY MEDICINE

## 2018-01-01 RX ORDER — PIMECROLIMUS 10 MG/G
CREAM TOPICAL 2 TIMES DAILY PRN
Qty: 60 G | Refills: 0 | Status: SHIPPED | OUTPATIENT
Start: 2018-01-01

## 2018-01-09 NOTE — NURSING NOTE
Chief Complaint   Patient presents with     Rash       Initial /90 (BP Location: Right arm, Patient Position: Sitting, Cuff Size: Adult Regular)  Temp 98.2  F (36.8  C) (Oral)  Resp 12  Ht 1.829 m (6')  Wt 110.7 kg (244 lb)  BMI 33.09 kg/m2 Estimated body mass index is 33.09 kg/(m^2) as calculated from the following:    Height as of this encounter: 1.829 m (6').    Weight as of this encounter: 110.7 kg (244 lb).  Medication Reconciliation: faby Crouch

## 2018-01-09 NOTE — MR AVS SNAPSHOT
After Visit Summary   1/9/2018    Alexi Oviedo    MRN: [id number]           Patient Information     Date Of Birth          1945        Visit Information        Provider Department      1/9/2018 9:40 AM Janelle Mari MD [facility]        Today's Diagnoses     Xerosis cutis    -  1    Hives due to cold exposure           Follow-ups after your visit        Additional Services     DERMATOLOGY REFERRAL       Your provider has referred you to: [facility]: [facility] [phone]   [email or web address]  [facility] [phone]   [email or web address]    Please be aware that coverage of these services is subject to the terms and limitations of your health insurance plan.  Call member services at your health plan with any benefit or coverage questions.      Please bring the following with you to your appointment:    (1) Any X-Rays, CTs or MRIs which have been performed.  Contact the facility where they were done to arrange for  prior to your scheduled appointment.    (2) List of current medications  (3) This referral request   (4) Any documents/labs given to you for this referral                  Follow-up notes from your care team     Return if symptoms worsen or fail to improve.      Your next 10 appointments already scheduled     Feb 13, 2018  9:00 AM CST   Return Visit with Lorri Reece MD   [facility])    [address] [place] [address] [id number] [phone]            May 08, 2018  9:30 AM CDT   Return Visit with Lorri Reece MD   [facility]    [id number] [place] [address] [id number] [phone]              Who to contact     If you have questions or need follow up information about today's clinic visit or  your schedule please contact [facility] directly at [phone].  Normal or non-critical lab and imaging results will be communicated to you by MyChart, letter or phone within 4 business days after the clinic has received the results. If you do not hear from us within 7 days, please contact the clinic through [employer] or phone. If you have a critical or abnormal lab result, we will notify you by phone as soon as possible.  Submit refill requests through [employer] or call your pharmacy and they will forward the refill request to us. Please allow 3 business days for your refill to be completed.          Additional Information About Your Visit        [employer] Information     [employer] gives you secure access to your electronic health record. If you see a primary care provider, you can also send messages to your care team and make appointments. If you have questions, please call your primary care clinic.  If you do not have a primary care provider, please call [phone] and they will assist you.        Care EveryWhere ID     This is your Care EveryWhere ID. This could be used by other organizations to access your [facility] medical records  [id number]        Your Vitals Were     Temperature Respirations Height BMI (Body Mass Index)          98.2  F (36.8  C) (Oral) 12 1.829 m (6') 33.09 kg/m2         Blood Pressure from Last 3 Encounters:   01/09/18 144/86   11/13/17 134/86   09/18/17 134/80    Weight from Last 3 Encounters:   01/09/18 110.7 kg (244 lb)   11/13/17 108.1 kg (238 lb 6.4 oz)   09/18/17 106.7 kg (235 lb 4.8 oz)              We Performed the Following     DERMATOLOGY REFERRAL          Today's Medication Changes          These changes are accurate as of: 1/9/18 10:08 AM.  If you have any questions, ask your nurse or doctor.               Start taking these medicines.        Dose/Directions    pimecrolimus 1 % cream   Commonly known as:  ELIDEL   Used for:  Hives due to cold exposure   Replaces:   predniSONE 20 MG tablet   Started by:  Janelle Mari MD        Apply topically 2 times daily as needed   Quantity:  60 g   Refills:  0         Stop taking these medicines if you haven't already. Please contact your care team if you have questions.     predniSONE 20 MG tablet   Commonly known as:  DELTASONE   Replaced by:  pimecrolimus 1 % cream   Stopped by:  Janelle Mari MD                Where to get your medicines      These medications were sent to [id number] - [place], [place] - [address] AT [facility] [address], [facility] [address]     Phone:  [phone]     pimecrolimus 1 % cream                Primary Care Provider Office Phone # Fax #    Janelle Mari [phone] [address] [facility] [address]        Equal Access to Services     [facility]: Hadii aad ku hadasho Soomaali, waaxda luqadaha, qaybta kaalmada adeegyada, waxay idiin hayaan adehallie kharablaine minayan . So [facility] [phone].    ATENCIÓN: Si habla español, tiene a keen disposición servicios gratuitos de asistencia lingüística. [facility] [phone].    We comply with applicable federal civil rights laws and [place] laws. We do not discriminate on the basis of race, color, national origin, age, disability, sex, sexual orientation, or gender identity.            Thank you!     Thank you for choosing [facility]  for your care. Our goal is always to provide you with excellent care. Hearing back from our patients is one way we can continue to improve our services. Please take a few minutes to complete the written survey that you may receive in the mail after your visit with us. Thank you!             Your Updated Medication List - Protect others around you: Learn how to safely use, store and throw away your medicines at www.disposemymeds.org.          This list is accurate as of: 1/9/18 10:08 AM.  Always use your most recent  med list.                   Brand Name Dispense Instructions for use Diagnosis    albuterol 108 (90 BASE) MCG/ACT Inhaler    PROAIR HFA/PROVENTIL HFA/VENTOLIN HFA    3 Inhaler    Inhale 2 puffs into the lungs every 6 hours as needed for shortness of breath / dyspnea or wheezing    Chronic obstructive pulmonary disease, unspecified COPD type (H)       amLODIPine-benazepril 10-40 MG per capsule    LOTREL    90 capsule    TAKE 1 CAPSULE BY MOUTH ONCE DAILY    Essential hypertension with goal blood pressure less than 140/90       cyanocobalamin 1000 MCG/ML injection    VITAMIN B12    1 mL    1 ml IM monthly    Fatigue, unspecified type       fluticasone-salmeterol 500-50 MCG/DOSE diskus inhaler    ADVAIR    3 Inhaler    Inhale 1 puff into the lungs every 12 hours    Chronic obstructive pulmonary disease, unspecified COPD type (H)       GOLD BOND EX           loratadine 10 MG tablet    CLARITIN    60 tablet    Take 1 tablet (10 mg) by mouth 2 times daily as needed for allergies    Hives due to cold exposure       magnesium chloride 535 (64 MG) MG Tbcr CR tablet     100 tablet    Take 1 tablet (535 mg) by mouth daily    Hypomagnesemia       nystatin cream    MYCOSTATIN    30 g    APPLY EXTERNALLY TO THE AFFECTED AREA THREE TIMES DAILY    Yeast dermatitis       omeprazole 20 MG CR capsule    priLOSEC    180 capsule    TAKE TWO CAPSULES BY MOUTH DAILY    Dyspepsia       pimecrolimus 1 % cream    ELIDEL    60 g    Apply topically 2 times daily as needed    Hives due to cold exposure       sodium chloride 1 GM tablet     90 tablet    TAKE 1 TABLET(1 GRAM) BY MOUTH DAILY    SIADH (syndrome of inappropriate ADH production) (H)       Testosterone 10 MG/ACT (2%) Gel    FORTESTA    60 g    Place 2 pumps onto the skin daily Apply from dispenser to clean, dry, intact skin of the upper arms and shoulders.    Male hypogonadism

## 2018-01-09 NOTE — PROGRESS NOTES
[facility] RN Care Coordination Note:    Called and left a follow up message for patient to discuss surveillance procedure patient is due for. Left my direct call back number for patient to call back and discuss options.     Maggie Vines RN   Care Coordinator - Dr Rich  [phone]

## 2018-01-09 NOTE — PROGRESS NOTES
SUBJECTIVE:   Alexi Oviedo is a 72 year old male who presents to clinic today for the following health issues:      Medication Followup of Creams for rash    Taking Medication as prescribed: NO    Side Effects:  None    Medication Helping Symptoms:  NO     Rash on face has gotten better but it seems the prednisone has made his face swollen    SUBJECTIVE:  Here today for the above.  Patient has suffered from what seems most consistent with xerosis resulting in dry erythematous itchy eruptions that affect primarily trunk but also upper arms and occasionally face.  Also seems to suffer from urticaria that may be cold induced.  In any case topical steroids have not been extremely effective.  We tried a short course of oral prednisone which was effective and this led to using this a few more times but he and other family members have no noted some facial swelling.  He has put on a little bit of weight.  Denies any significant changes in urination, thirst, nausea, etc.    Review of systems otherwise negative.  Past medical, family, and social history reviewed and updated in chart.    OBJECTIVE:  /86 (BP Location: Right arm, Patient Position: Sitting, Cuff Size: Adult Regular)  Temp 98.2  F (36.8  C) (Oral)  Resp 12  Ht 1.829 m (6')  Wt 110.7 kg (244 lb)  BMI 33.09 kg/m2  Alert, pleasant, upbeat, and in no apparent discomfort.  Face is notably rounder than in the past  S1 and S2 normal, no murmurs, clicks, gallops or rubs. Regular rate and rhythm. Chest is clear; no wheezes or rales. No edema or JVD.    Skin -nonspecific excoriated eruptions without discrete patches or plaques  Past labs reviewed with the patient.      ASSESSMENT / PLAN:  (L85.3) Xerosis cutis  (primary encounter diagnosis)  Comment: I do think the prednisone is causing some water retention and moon facies.  And though this seems most consistent with simple xerosis I am not 100% sure and I think seeing a dermatologist would help us in our  treatment options.  In the meantime we should change away from oral and possibly topical steroids and try Elidel  Plan: DERMATOLOGY REFERRAL        Discussed mechanism of action of the proposed medication, as well as potential effects, both good and bad.  Patient expressed understanding and agreed with treatment.     (L50.2) Hives due to cold exposure  Comment: as above   Plan: pimecrolimus (ELIDEL) 1 % cream, DERMATOLOGY         REFERRAL            Follow up based upon results  PETAR Mari MD    (Chart documentation completed in part with Dragon voice-recognition software.  Even though reviewed some grammatical, spelling, and word errors may remain.)

## 2018-01-18 NOTE — TELEPHONE ENCOUNTER
"Requested Prescriptions   Pending Prescriptions Disp Refills     omeprazole (PRILOSEC) 20 MG CR capsule [Pharmacy Med Name: OMEPRAZOLE CAPS 20MG] 180 capsule 1     Sig: TAKE 2 CAPSULES DAILY    PPI Protocol Passed    1/18/2018  3:31 AM       Passed - Not on Clopidogrel (unless Pantoprazole ordered)       Passed - No diagnosis of osteoporosis on record       Passed - Recent or future visit with authorizing provider's specialty    Patient had office visit in the last year or has a visit in the next 30 days with authorizing provider.  See \"Patient Info\" tab in inbasket, or \"Choose Columns\" in Meds & Orders section of the refill encounter.            Passed - Patient is age 18 or older        omeprazole (PRILOSEC) 20 MG CR capsule  Last Written Prescription Date:  8/23/17  Last Fill Quantity: 180,  # refills: 1   Last Office Visit with [facility], [facility] or [facility] prescribing provider:  1/9/18   Future Office Visit:    Next 5 appointments (look out 90 days)     Feb 13, 2018  9:00 AM CST   [facility] Visit with Lorri [facility]ece MD   [facility]    [address] [address] [place] [id number] [phone]                   "

## 2018-01-18 NOTE — TELEPHONE ENCOUNTER
"Last OV: 1/9/18    Requested Prescriptions   Pending Prescriptions Disp Refills     omeprazole (PRILOSEC) 20 MG CR capsule [Pharmacy Med Name: OMEPRAZOLE CAPS 20MG] 180 capsule 1     Sig: TAKE 2 CAPSULES DAILY    PPI Protocol Passed    1/18/2018  8:54 AM       Passed - Not on Clopidogrel (unless Pantoprazole ordered)       Passed - No diagnosis of osteoporosis on record       Passed - Recent or future visit with authorizing provider's specialty    Patient had office visit in the last year or has a visit in the next 30 days with authorizing provider.  See \"Patient Info\" tab in inbasket, or \"Choose Columns\" in Meds & Orders section of the refill encounter.            Passed - Patient is age 18 or older        Prescription approved per [facility] Refill Protocol.    Tory Yañez RN, BSN    "

## 2018-01-26 ENCOUNTER — TELEPHONE (OUTPATIENT)
Dept: PULMONOLOGY | Facility: CLINIC | Age: 73
End: 2018-01-26

## 2018-06-19 NOTE — MR AVS SNAPSHOT
After Visit Summary   9/18/2017    Alexi Oviedo    MRN: [id number]           Patient Information     Date Of Birth          1945        Visit Information        Provider Department      9/18/2017 10:00 AM Janelle Mari MD [facility]        Today's Diagnoses     SIADH (syndrome of inappropriate ADH production) (H)    -  1    Chronic obstructive pulmonary disease, unspecified COPD type (H)        Male hypogonadism        Essential hypertension with goal blood pressure less than 140/90        Hyperlipidemia LDL goal <130        Dermatitis        Tobacco abuse        Need for prophylactic vaccination and inoculation against influenza          Care Instructions      HOW TO QUIT SMOKING  Smoking is one of the hardest habits to break. About half of all those who have ever smoked have been able to quit, and most of those (about 70%) who still smoke want to quit. Here are some of the best ways to stop smoking.     KEEP TRYING:  It takes most smokers about 8 tries before they are finally able to fully quit. So, the more often you try and fail, the better your chance of quitting the next time! So, don't give up!    GO COLD TURKEY:  Most ex-smokers quit cold turkey. Trying to cut back gradually doesn't seem to work as well, perhaps because it continues the smoking habit. Also, it is possible to fool yourself by inhaling more while smoking fewer cigarettes. This results in the same amount of nicotine in your body!    GET SUPPORT:  Support programs can make an important difference, especially for the heavy smoker. These groups offer lectures, methods to change your behavior and peer support. Call the free national Quitline for more information. 800-QUIT-NOW (527-431-7042). Low-cost or free programs are offered by many hospitals, local chapters of the American Lung Association (307-812-7198) and the American Cancer Society (590-256-0329). Support at home is important too. Non-smokers  can help by offering praise and encouragement. If the smoker fails to quit, encourage them to try again!    OVER-THE-COUNTER MEDICINES:  For those who can't quit on their own, Nicotine Replacement Therapy (NRT) may make quitting much easier. Certain aids such as the nicotine patch, gum and lozenge are available without a prescription. However, it is best to use these under the guidance of your doctor. The skin patch provides a steady supply of nicotine to the body. Nicotine gum and lozenge gives temporary bursts of low levels of nicotine. Both methods take the edge off the craving for cigarettes. WARNING: If you feel symptoms of nicotine overdose, such as nausea, vomiting, dizziness, weakness, or fast heartbeat, stop using these and see your doctor.    PRESCRIPTION MEDICINES:  After evaluating your smoking patterns and prior attempts at quitting, your doctor may offer a prescription medicine such as bupropion (Zyban, Wellbutrin), varenicline (Chantix, Champix), a niocotine inhaler or nasal spray. Each has its unique advantage and side effects which your doctor can review with you.    HEALTH BENEFITS OF QUITTING:  The benefits of quitting start right away and keep improving the longer you go without smokin minutes: blood pressure and pulse return to normal  8 hours: oxygen levels return to normal  2 days: ability to smell and taste begins to improve as damaged nerves start to regrow  2-3 weeks: circulation and lung function improves  1-9 months: decreased cough, congestion and shortness of breath; less tired  1 year: risk of heart attack decreases by half  5 years: risk of lung cancer decreases by half; risk of stroke becomes the same as a non-smoker  For information about how to quit smoking, visit the following links:  National Cancer [place] ,   Clearing the Air, Quit Smoking Today   - an online booklet. http://www.smokefree.gov/pubs/clearing_the_air.pdf  Smokefree.gov http://smokefree.gov/  QuitNet  http://www.quitnet.com/    7535-3796 Essie Beckham, [address] [facility], [place] [address]. All rights reserved. This information is not intended as a substitute for professional medical care. Always follow your healthcare professional's instructions.    The Benefits of Living Smoke Free  What do you want to gain from quitting? Check off some reasons to quit.  Health Benefits  ___ Reduce my risk of lung cancer, heart disease, chronic lung disease  ___ Have fewer wrinkles and softer skin  ___ Improve my sense of taste and smell  ___ For pregnant women--reduce the risk of having a miscarriage, stillbirth, premature birth, or low-birth-weight baby  Personal Benefits  ___ Feel more in control of my life  ___ Have better-smelling hair, breath, clothes, home, and car  ___ Save time by not having to take smoke breaks, buy cigarettes, or hunt for a light  ___ Have whiter teeth  Family Benefits  ___ Reduce my children s respiratory tract infections  ___ Set a good example for my children  ___ Reduce my family s cancer risk  Financial Benefits  ___ Save hundreds of dollars each year that would be spent on cigarettes  ___ Save money on medical bills  ___ Save on life, health, and car insurance premiums    Those Dollars Add Up!  Cigarettes are expensive, and getting more expensive all the time. Do you realize how much money you are spending on cigarettes per year? What is the average amount you spend on a pack of cigarettes? What is the average number of packs that you smoke per day? Using your answers to these questions, fill in this formula to help you find out:  ($ _____ per pack) ×  ( _____ number of packs per day) × (365 days) =  $ _____ yearly cost of smoking  Besides tobacco, there are other costs, including extra cleaning bills and replacement costs for clothing and furniture; medical expenses for smoking-related illnesses; and higher health, life, and car insurance premiums.    Cigars and Pipes Count Too!  Cigars  and pipes are also dangerous. So are smokeless (chewing) tobacco and snuff. All of these products contain nicotine, a highly addictive substance that has harmful effects on your body. Quitting smoking means giving up all tobacco products.      1123-2207 [facility], [address] [place] [address], [place] [address]. All rights reserved. This information is not intended as a substitute for professional medical care. Always follow your healthcare professional's instructions.          Follow-ups after your visit        Follow-up notes from your care team     Return in about 6 months (around 3/18/2018).      Your next 10 appointments already scheduled     May 08, 2018  9:30 AM CDT   Return Visit with Lorri Reece MD   [facility] ([facility])    [id number] [place] [id number] [phone]              Who to contact     If you have questions or need follow up information about today's clinic visit or your schedule please contact [facility] directly at [phone].  Normal or non-critical lab and imaging results will be communicated to you by MyChart, letter or phone within 4 business days after the clinic has received the results. If you do not hear from us within 7 days, please contact the clinic through [employer] or phone. If you have a critical or abnormal lab result, we will notify you by phone as soon as possible.  Submit refill requests through [employer] or call your pharmacy and they will forward the refill request to us. Please allow 3 business days for your refill to be completed.          Additional Information About Your Visit        [employer] Information     [employer] gives you secure access to your electronic health record. If you see a primary care provider, you can also send messages to your care team and make appointments. If you have questions, please call your primary care clinic.  If you do not have a primary care provider, please  call [phone] and they will assist you.        Care EveryWhere ID     This is your Care EveryWhere ID. This could be used by other organizations to access your [place] medical records  [id number]        Your Vitals Were     Pulse Temperature Respirations Height Pulse Oximetry BMI (Body Mass Index)    74 98.6  F (37  C) (Oral) 16 1.829 m (6') 94% 31.91 kg/m2       Blood Pressure from Last 3 Encounters:   09/18/17 134/80   08/08/17 145/77   05/02/17 104/69    Weight from Last 3 Encounters:   09/18/17 106.7 kg (235 lb 4.8 oz)   08/08/17 104.1 kg (229 lb 8 oz)   05/02/17 104.2 kg (229 lb 12.8 oz)              We Performed the Following     Comprehensive metabolic panel     FLU VACCINE, INCREASED ANTIGEN, PRESV FREE, AGE 65+ [41857]     Lipid panel reflex to direct LDL     Tobacco Cessation - for Health Maintenance     Vaccine Administration, Initial [47114]          Today's Medication Changes          These changes are accurate as of: 9/18/17 10:42 AM.  If you have any questions, ask your nurse or doctor.               These medicines have changed or have updated prescriptions.        Dose/Directions    * fluocinonide 0.05 % cream   Commonly known as:  LIDEX   This may have changed:  Another medication with the same name was added. Make sure you understand how and when to take each.   Used for:  Dermatitis   Changed by:  Janelle Mari MD        APPLY TO THE AFFECTED AREA TWICE DAILY AS NEEDED   Quantity:  60 g   Refills:  0       * fluocinonide 0.05 % solution   Commonly known as:  LIDEX   This may have changed:  You were already taking a medication with the same name, and this prescription was added. Make sure you understand how and when to take each.   Used for:  Dermatitis   Changed by:  Janelle Mari MD        Apply topically nightly as needed   Quantity:  60 mL   Refills:  2       * Notice:  This list has 2 medication(s) that are the same as other medications prescribed for you. Read the  directions carefully, and ask your doctor or other care provider to review them with you.         Where to get your medicines      These medications were sent to [id number] - [place], [place] - [address] AT [facility] [address], [place] [address]     Phone:  [phone]     fluocinonide 0.05 % solution         Some of these will need a paper prescription and others can be bought over the counter.  Ask your nurse if you have questions.     Bring a paper prescription for each of these medications     Testosterone 10 MG/ACT (2%) Gel                Primary Care Provider Office Phone # Fax #    Janelle Rian Mari [phone] [address] [facility] [address]        Equal Access to Services     JUVENTINO GILBERT : Hadii sonia elias hadasho Soomaali, waaxda luqadaha, qaybta kaalmada adeegyada, anthony larios. So [facility] [place] [phone].    ATENCIÓN: Si habla español, tiene a keen disposición servicios gratuitos de asistencia lingüística. [facility] [phone].    We comply with applicable federal civil rights laws and [place] laws. We do not discriminate on the basis of race, color, national origin, age, disability sex, sexual orientation or gender identity.            Thank you!     Thank you for choosing [facility]  for your care. Our goal is always to provide you with excellent care. Hearing back from our patients is one way we can continue to improve our services. Please take a few minutes to complete the written survey that you may receive in the mail after your visit with us. Thank you!             Your Updated Medication List - Protect others around you: Learn how to safely use, store and throw away your medicines at www.disposemymeds.org.          This list is accurate as of: 9/18/17 10:42 AM.  Always use your most recent med list.                   Brand Name Dispense Instructions for use Diagnosis     albuterol 108 (90 BASE) MCG/ACT Inhaler    PROAIR HFA/PROVENTIL HFA/VENTOLIN HFA    3 Inhaler    Inhale 2 puffs into the lungs every 6 hours as needed for shortness of breath / dyspnea or wheezing    Chronic obstructive pulmonary disease, unspecified COPD type (H)       amLODIPine-benazepril 10-40 MG per capsule    LOTREL    90 capsule    TAKE 1 CAPSULE BY MOUTH ONCE DAILY    Essential hypertension with goal blood pressure less than 140/90       cyanocobalamin 1000 MCG/ML injection    VITAMIN B12    1 mL    1 ml IM monthly    Fatigue, unspecified type       * fluocinonide 0.05 % cream    LIDEX    60 g    APPLY TO THE AFFECTED AREA TWICE DAILY AS NEEDED    Dermatitis       * fluocinonide 0.05 % solution    LIDEX    60 mL    Apply topically nightly as needed    Dermatitis       fluticasone-salmeterol 500-50 MCG/DOSE diskus inhaler    ADVAIR    3 Inhaler    Inhale 1 puff into the lungs every 12 hours    Chronic obstructive pulmonary disease, unspecified COPD type (H)       magnesium chloride 535 (64 MG) MG Tbcr CR tablet     100 tablet    Take 1 tablet (535 mg) by mouth daily    Hypomagnesemia       omeprazole 20 MG CR capsule    priLOSEC    180 capsule    TAKE TWO CAPSULES BY MOUTH DAILY    Dyspepsia       sodium chloride 1 GM tablet     90 tablet    TAKE 1 TABLET(1 GRAM) BY MOUTH DAILY    SIADH (syndrome of inappropriate ADH production) (H)       Testosterone 10 MG/ACT (2%) Gel    FORTESTA    60 g    Place 2 pumps onto the skin daily Apply from dispenser to clean, dry, intact skin of the upper arms and shoulders.    Male hypogonadism       * Notice:  This list has 2 medication(s) that are the same as other medications prescribed for you. Read the directions carefully, and ask your doctor or other care provider to review them with you.       Lateral